# Patient Record
Sex: MALE | Race: WHITE | NOT HISPANIC OR LATINO | URBAN - METROPOLITAN AREA
[De-identification: names, ages, dates, MRNs, and addresses within clinical notes are randomized per-mention and may not be internally consistent; named-entity substitution may affect disease eponyms.]

---

## 2018-09-22 ENCOUNTER — EMERGENCY (EMERGENCY)
Facility: HOSPITAL | Age: 63
LOS: 1 days | Discharge: HOME | End: 2018-09-22
Attending: EMERGENCY MEDICINE
Payer: COMMERCIAL

## 2018-09-22 VITALS
RESPIRATION RATE: 18 BRPM | HEART RATE: 79 BPM | OXYGEN SATURATION: 98 % | SYSTOLIC BLOOD PRESSURE: 129 MMHG | DIASTOLIC BLOOD PRESSURE: 84 MMHG | TEMPERATURE: 98 F

## 2018-09-22 VITALS
RESPIRATION RATE: 17 BRPM | DIASTOLIC BLOOD PRESSURE: 71 MMHG | OXYGEN SATURATION: 96 % | TEMPERATURE: 100 F | SYSTOLIC BLOOD PRESSURE: 119 MMHG | HEART RATE: 79 BPM

## 2018-09-22 DIAGNOSIS — Z98.89 OTHER SPECIFIED POSTPROCEDURAL STATES: Chronic | ICD-10-CM

## 2018-09-22 DIAGNOSIS — Z90.49 ACQUIRED ABSENCE OF OTHER SPECIFIED PARTS OF DIGESTIVE TRACT: Chronic | ICD-10-CM

## 2018-09-22 PROCEDURE — 72070 X-RAY EXAM THORAC SPINE 2VWS: CPT | Mod: 26

## 2018-09-22 PROCEDURE — 72100 X-RAY EXAM L-S SPINE 2/3 VWS: CPT

## 2018-09-22 PROCEDURE — 72070 X-RAY EXAM THORAC SPINE 2VWS: CPT

## 2018-09-22 PROCEDURE — 72100 X-RAY EXAM L-S SPINE 2/3 VWS: CPT | Mod: 26

## 2018-09-22 PROCEDURE — 99283 EMERGENCY DEPT VISIT LOW MDM: CPT | Mod: 25

## 2018-09-22 PROCEDURE — 99283 EMERGENCY DEPT VISIT LOW MDM: CPT

## 2018-09-22 RX ORDER — IBUPROFEN 200 MG
400 TABLET ORAL ONCE
Qty: 0 | Refills: 0 | Status: COMPLETED | OUTPATIENT
Start: 2018-09-22 | End: 2018-09-22

## 2018-09-22 RX ADMIN — Medication 400 MILLIGRAM(S): at 16:10

## 2018-09-22 NOTE — ED PROVIDER NOTE - MEDICAL DECISION MAKING DETAILS
Pt with mechanical fall x yesterday, now with diffuse back pain. Will check T and L spine xray and reassess.

## 2018-09-22 NOTE — ED PROVIDER NOTE - PMH
Cholecystitis chronic, acute    GERD (gastroesophageal reflux disease)    IBS (irritable bowel syndrome)

## 2018-09-22 NOTE — ED PROVIDER NOTE - PROGRESS NOTE DETAILS
X-rays of T-S and L-S with no acute abnormality.  NSAID's advised and advised strict return precautions and PMD f/u.

## 2018-09-22 NOTE — ED PROVIDER NOTE - OBJECTIVE STATEMENT
61 y/o male with no significant PMHx presents to the ED c/o back pain s/p mechanical fall x yesterday. Pt notes he was up on a 4ft ladder when he fell backwards off of the ladder, landing on his back. Pt notes diffuse back pain. Pt denies weakness, numbness, LOC, HA, neck pain, head injury, hematuria, shortness of breath, or any other complaints. Pt notes nml urination following incident. Pt denies any social Hx. NKDA

## 2018-09-22 NOTE — ED PROVIDER NOTE - CHPI ED SYMPTOMS NEG
no HA, no neck pain, no head injury, no hematuria, no shortness of breath/no weakness/no numbness/no loss of consciousness

## 2019-12-09 ENCOUNTER — APPOINTMENT (OUTPATIENT)
Dept: VASCULAR SURGERY | Facility: CLINIC | Age: 64
End: 2019-12-09
Payer: COMMERCIAL

## 2019-12-09 VITALS
TEMPERATURE: 98.2 F | SYSTOLIC BLOOD PRESSURE: 118 MMHG | WEIGHT: 130 LBS | DIASTOLIC BLOOD PRESSURE: 75 MMHG | OXYGEN SATURATION: 98 % | BODY MASS INDEX: 24.55 KG/M2 | HEART RATE: 75 BPM | HEIGHT: 61 IN

## 2019-12-09 PROCEDURE — 93970 EXTREMITY STUDY: CPT

## 2019-12-09 PROCEDURE — 99203 OFFICE O/P NEW LOW 30 MIN: CPT

## 2020-06-15 ENCOUNTER — APPOINTMENT (OUTPATIENT)
Dept: VASCULAR SURGERY | Facility: CLINIC | Age: 65
End: 2020-06-15

## 2020-12-08 NOTE — ED ADULT NURSE NOTE - CAS EDN DISCHARGE ASSESSMENT
Alert and oriented to person, place and time Complex Repair And Tissue Cultured Epidermal Autograft Text: The defect edges were debeveled with a #15 scalpel blade.  The primary defect was closed partially with a complex linear closure.  Given the location of the defect, shape of the defect and the proximity to free margins an tissue cultured epidermal autograft was deemed most appropriate to repair the remaining defect.  The graft was trimmed to fit the size of the remaining defect.  The graft was then placed in the primary defect, oriented appropriately, and sutured into place.

## 2022-01-25 ENCOUNTER — NON-APPOINTMENT (OUTPATIENT)
Age: 67
End: 2022-01-25

## 2022-01-25 ENCOUNTER — APPOINTMENT (OUTPATIENT)
Dept: THORACIC SURGERY | Facility: CLINIC | Age: 67
End: 2022-01-25
Payer: COMMERCIAL

## 2022-01-25 VITALS
HEART RATE: 79 BPM | SYSTOLIC BLOOD PRESSURE: 129 MMHG | WEIGHT: 130 LBS | OXYGEN SATURATION: 95 % | RESPIRATION RATE: 17 BRPM | BODY MASS INDEX: 25.52 KG/M2 | DIASTOLIC BLOOD PRESSURE: 83 MMHG | HEIGHT: 60 IN

## 2022-01-25 PROCEDURE — 99205 OFFICE O/P NEW HI 60 MIN: CPT

## 2022-01-26 ENCOUNTER — NON-APPOINTMENT (OUTPATIENT)
Age: 67
End: 2022-01-26

## 2022-01-26 ENCOUNTER — APPOINTMENT (OUTPATIENT)
Dept: GASTROENTEROLOGY | Facility: CLINIC | Age: 67
End: 2022-01-26
Payer: COMMERCIAL

## 2022-01-26 PROCEDURE — 99442: CPT

## 2022-01-26 NOTE — HISTORY OF PRESENT ILLNESS
[FreeTextEntry1] : Mr. TROY OBRIEN, 66 year old male, never a smoker, w/ hx of Appendectomy and nephrectomy (Kidney donation); Severe reflux, Zenker's diverticulum and large hiatal hernia Referred by Dr. Goldsmith for further evaluation and treatment.\par \par  Esophagram and GI series on 1/5/22: \par - Zenker's diverticulum unchanged from the prior study measuring 6 mm\par - This fills and empties with associated vigorous cricopharyngeal contraction which relaxes rapidly\par - Sliding hiatal hernia\par \par Patient presents today for evaluation. Relays severe reflux that is interfering with quality of life; On PPI with no resolve of symptoms. Experiences occasional dysphagia, especially with pills. Today, patient denies worsening SOB, chest pain, cough, hemoptysis, fever, chills, night sweats, lightheadedness or dizziness.\par \par \par

## 2022-01-26 NOTE — ASSESSMENT
[FreeTextEntry1] : Mr. TROY OBRIEN, 66 year old male, never a smoker, w/ hx of Appendectomy and nephrectomy (Kidney donation); Severe reflux, Zenker's diverticulum and large hiatal hernia Referred by Dr. Goldsmith for further evaluation and treatment.\par \par I have independently reviewed the medical records at the time of this office consultation, and discussed the following interpretations and recommendations with the patient:\par - Discussed necessity for further testing to determine most appropriate treatment. Therefore, will refer to Dr. Mckay for HIDALGO and Manometry. In addition, recommendation for CT chest. Office will assist on obtaining esophagram imaging from outside institution. Return to clinic following all testing to discuss results and further plan of care. \par \par Recommendations reviewed with patient during this office visit, and all questions answered; Patient instructed on the importance of follow up and verbalizes understanding.\par \par I personally performed the services described in the documentation, reviewed the documentation recorded by the scribe in my presence and it accurately and completely records my words and actions.\par \par I, YANELIS Knowles-C, am scribing for and the presence of ANNA RothmanIM, the following sections HISTORY OF PRESENT ILLNESS, PAST MEDICAL/FAMILY/SOCIAL HISTORY; REVIEW OF SYSTEMS; VITAL SIGNS; PHYSICAL EXAM; DISPOSITION.\par \par \par \par

## 2022-01-26 NOTE — DATA REVIEWED
[FreeTextEntry1] : Independently reviewed the following imaging:\par - Esophagram and Gi series report

## 2022-01-26 NOTE — PHYSICAL EXAM
[General Appearance - Alert] : alert [General Appearance - In No Acute Distress] : in no acute distress [General Appearance - Well Nourished] : well nourished [Sclera] : the sclera and conjunctiva were normal [PERRL With Normal Accommodation] : pupils were equal in size, round, and reactive to light [Extraocular Movements] : extraocular movements were intact [Outer Ear] : the ears and nose were normal in appearance [Hearing Threshold Finger Rub Not [place]] : hearing was normal [Neck Appearance] : the appearance of the neck was normal [Neck Cervical Mass (___cm)] : no neck mass was observed [Respiration, Rhythm And Depth] : normal respiratory rhythm and effort [Exaggerated Use Of Accessory Muscles For Inspiration] : no accessory muscle use [Auscultation Breath Sounds / Voice Sounds] : lungs were clear to auscultation bilaterally [Heart Rate And Rhythm] : heart rate was normal and rhythm regular [Examination Of The Chest] : the chest was normal in appearance [Chest Visual Inspection Thoracic Asymmetry] : no chest asymmetry [Diminished Respiratory Excursion] : normal chest expansion [2+] : left 2+ [Breast Appearance] : normal in appearance [Breast Palpation Mass] : no palpable masses [Bowel Sounds] : normal bowel sounds [Abdomen Soft] : soft [Abdomen Tenderness] : non-tender [Cervical Lymph Nodes Enlarged Posterior Bilaterally] : posterior cervical [Cervical Lymph Nodes Enlarged Anterior Bilaterally] : anterior cervical [Supraclavicular Lymph Nodes Enlarged Bilaterally] : supraclavicular [No CVA Tenderness] : no ~M costovertebral angle tenderness [No Spinal Tenderness] : no spinal tenderness [Abnormal Walk] : normal gait [Nail Clubbing] : no clubbing  or cyanosis of the fingernails [Musculoskeletal - Swelling] : no joint swelling seen [Skin Color & Pigmentation] : normal skin color and pigmentation [Skin Turgor] : normal skin turgor [] : no rash [No Focal Deficits] : no focal deficits [Oriented To Time, Place, And Person] : oriented to person, place, and time [FreeTextEntry1] : Deferred

## 2022-03-02 ENCOUNTER — NON-APPOINTMENT (OUTPATIENT)
Age: 67
End: 2022-03-02

## 2022-03-08 ENCOUNTER — NON-APPOINTMENT (OUTPATIENT)
Age: 67
End: 2022-03-08

## 2022-03-09 ENCOUNTER — APPOINTMENT (OUTPATIENT)
Dept: GASTROENTEROLOGY | Facility: HOSPITAL | Age: 67
End: 2022-03-09

## 2022-03-09 ENCOUNTER — RESULT REVIEW (OUTPATIENT)
Age: 67
End: 2022-03-09

## 2022-03-09 ENCOUNTER — OUTPATIENT (OUTPATIENT)
Dept: OUTPATIENT SERVICES | Facility: HOSPITAL | Age: 67
LOS: 1 days | End: 2022-03-09
Payer: COMMERCIAL

## 2022-03-09 VITALS
SYSTOLIC BLOOD PRESSURE: 119 MMHG | OXYGEN SATURATION: 100 % | HEART RATE: 78 BPM | DIASTOLIC BLOOD PRESSURE: 76 MMHG | RESPIRATION RATE: 16 BRPM

## 2022-03-09 VITALS
RESPIRATION RATE: 21 BRPM | DIASTOLIC BLOOD PRESSURE: 84 MMHG | SYSTOLIC BLOOD PRESSURE: 125 MMHG | TEMPERATURE: 97 F | WEIGHT: 130.07 LBS | HEART RATE: 75 BPM | OXYGEN SATURATION: 100 % | HEIGHT: 60 IN

## 2022-03-09 DIAGNOSIS — Z90.49 ACQUIRED ABSENCE OF OTHER SPECIFIED PARTS OF DIGESTIVE TRACT: Chronic | ICD-10-CM

## 2022-03-09 DIAGNOSIS — K22.5 DIVERTICULUM OF ESOPHAGUS, ACQUIRED: ICD-10-CM

## 2022-03-09 DIAGNOSIS — Z90.5 ACQUIRED ABSENCE OF KIDNEY: Chronic | ICD-10-CM

## 2022-03-09 DIAGNOSIS — Z98.89 OTHER SPECIFIED POSTPROCEDURAL STATES: Chronic | ICD-10-CM

## 2022-03-09 DIAGNOSIS — K44.9 DIAPHRAGMATIC HERNIA WITHOUT OBSTRUCTION OR GANGRENE: ICD-10-CM

## 2022-03-09 PROCEDURE — 88305 TISSUE EXAM BY PATHOLOGIST: CPT | Mod: 26

## 2022-03-09 PROCEDURE — 91035 G-ESOPH REFLX TST W/ELECTROD: CPT

## 2022-03-09 PROCEDURE — 43239 EGD BIOPSY SINGLE/MULTIPLE: CPT

## 2022-03-09 PROCEDURE — 88305 TISSUE EXAM BY PATHOLOGIST: CPT

## 2022-03-09 RX ORDER — SODIUM CHLORIDE 9 MG/ML
500 INJECTION INTRAMUSCULAR; INTRAVENOUS; SUBCUTANEOUS
Refills: 0 | Status: DISCONTINUED | OUTPATIENT
Start: 2022-03-09 | End: 2022-03-23

## 2022-03-09 NOTE — ASU DISCHARGE PLAN (ADULT/PEDIATRIC) - NS MD DC FALL RISK RISK
For information on Fall & Injury Prevention, visit: [email or web address] OR  [email or web address] OR  https://www.cdc.gov/steadi/patient.html

## 2022-03-09 NOTE — ASU PATIENT PROFILE, ADULT - NSICDXPASTSURGICALHX_GEN_ALL_CORE_FT
PAST SURGICAL HISTORY:  S/P appendectomy     S/P cholecystectomy      PAST SURGICAL HISTORY:  H/O kidney removal left for donor    S/P appendectomy     S/P cholecystectomy

## 2022-03-09 NOTE — PRE PROCEDURE NOTE - PRE PROCEDURE EVALUATION
Attending Physician:              Shabbir Mckay MD MSEd    Indication for Procedure          HH      PAST MEDICAL & SURGICAL HISTORY:  IBS (irritable bowel syndrome)    GERD (gastroesophageal reflux disease)    Cholecystitis chronic, acute    S/P appendectomy    S/P cholecystectomy    H/O kidney removal  left for donor          See Allscripts Note for further details  ALLERGIES:  No Known Allergies    HOME MEDICATIONS:  omeprazole 40 mg oral delayed release capsule: 1 cap(s) orally once a day  traMADol 50 mg oral tablet: 1 tab(s) orally every 4 hours, As Needed  Wellbutrin  mg/24 hours oral tablet, extended release: 1 tab(s) orally every 24 hours      See Allscripts Note for further details    AICD/PPM: [ ] yes   [x ] no    PERTINENT LAB DATA:                      PHYSICAL EXAMINATION:    Height (cm): 152.4  Weight (kg): 59  BMI (kg/m2): 25.4  BSA (m2): 1.55T(C): 36.1  HR: 75  BP: 125/84  RR: 21  SpO2: 100%    Constitutional: NAD  HEENT: PERRLA, EOMI,    Neck:  No JVD  Respiratory: CTAB/L  Cardiovascular: S1 and S2  Gastrointestinal: BS+, soft, NT/ND  Extremities: No peripheral edema  Neurological: A/O x 3, no focal deficits  Psychiatric: Normal mood, normal affect  Skin: No rashes    ASA Class: I [ ]  II [ ]  III [x ]  IV [ ]    COMMENTS:    The patient is a suitable candidate for the planned procedure unless box checked [ ]  No, explain:

## 2022-03-09 NOTE — PRE-ANESTHESIA EVALUATION ADULT - NSANTHOSAYNRD_GEN_A_CORE
No. SY screening performed.  STOP BANG Legend: 0-2 = LOW Risk; 3-4 = INTERMEDIATE Risk; 5-8 = HIGH Risk

## 2022-03-09 NOTE — ASU PATIENT PROFILE, ADULT - NSICDXPASTMEDICALHX_GEN_ALL_CORE_FT
PAST MEDICAL HISTORY:  Cholecystitis chronic, acute     GERD (gastroesophageal reflux disease)     IBS (irritable bowel syndrome)

## 2022-03-09 NOTE — ASU PATIENT PROFILE, ADULT - FALL HARM RISK - UNIVERSAL INTERVENTIONS
Bed in lowest position, wheels locked, appropriate side rails in place/Call bell, personal items and telephone in reach/Instruct patient to call for assistance before getting out of bed or chair/Non-slip footwear when patient is out of bed/[place] to call system/Physically safe environment - no spills, clutter or unnecessary equipment/Purposeful Proactive Rounding/Room/bathroom lighting operational, light cord in reach

## 2022-03-11 LAB — SURGICAL PATHOLOGY STUDY: SIGNIFICANT CHANGE UP

## 2022-03-11 PROCEDURE — 91035 G-ESOPH REFLX TST W/ELECTROD: CPT | Mod: 26

## 2022-03-16 ENCOUNTER — APPOINTMENT (OUTPATIENT)
Dept: GASTROENTEROLOGY | Facility: HOSPITAL | Age: 67
End: 2022-03-16

## 2022-03-16 ENCOUNTER — OUTPATIENT (OUTPATIENT)
Dept: OUTPATIENT SERVICES | Facility: HOSPITAL | Age: 67
LOS: 1 days | End: 2022-03-16
Payer: COMMERCIAL

## 2022-03-16 VITALS
WEIGHT: 134.92 LBS | DIASTOLIC BLOOD PRESSURE: 73 MMHG | RESPIRATION RATE: 16 BRPM | HEIGHT: 62 IN | OXYGEN SATURATION: 100 % | TEMPERATURE: 97 F | SYSTOLIC BLOOD PRESSURE: 115 MMHG

## 2022-03-16 DIAGNOSIS — Z98.89 OTHER SPECIFIED POSTPROCEDURAL STATES: Chronic | ICD-10-CM

## 2022-03-16 DIAGNOSIS — Z90.5 ACQUIRED ABSENCE OF KIDNEY: Chronic | ICD-10-CM

## 2022-03-16 DIAGNOSIS — K22.5 DIVERTICULUM OF ESOPHAGUS, ACQUIRED: ICD-10-CM

## 2022-03-16 DIAGNOSIS — Z90.49 ACQUIRED ABSENCE OF OTHER SPECIFIED PARTS OF DIGESTIVE TRACT: Chronic | ICD-10-CM

## 2022-03-16 PROCEDURE — 91037 ESOPH IMPED FUNCTION TEST: CPT

## 2022-03-16 PROCEDURE — 91010 ESOPHAGUS MOTILITY STUDY: CPT

## 2022-03-16 PROCEDURE — 91010 ESOPHAGUS MOTILITY STUDY: CPT | Mod: 26

## 2022-03-16 PROCEDURE — 91037 ESOPH IMPED FUNCTION TEST: CPT | Mod: 26

## 2022-03-16 NOTE — PRE PROCEDURE NOTE - PRE PROCEDURE EVALUATION
Attending Physician:              Shabbir Mckay MD MSEd  Performing provider:            YANELIS Leblanc  Indication for Procedure:       Hiatal Hernia, Zenker's Diverticulum                PAST MEDICAL & SURGICAL HISTORY:  IBS (irritable bowel syndrome)    GERD (gastroesophageal reflux disease)    Cholecystitis chronic, acute    S/P appendectomy    S/P cholecystectomy    H/O kidney removal  left for donor          See Allscripts Note for further details  ALLERGIES:  No Known Allergies    HOME MEDICATIONS:  omeprazole 40 mg oral delayed release capsule: 1 cap(s) orally once a day  traMADol 50 mg oral tablet: 1 tab(s) orally every 4 hours, As Needed  Wellbutrin  mg/24 hours oral tablet, extended release: 1 tab(s) orally every 24 hours      See Allscripts Note for further details    AICD/PPM: [ ] yes   [ X] no    PERTINENT LAB DATA:                      PHYSICAL EXAMINATION:    Height (cm): 157.5  Weight (kg): 61.2  BMI (kg/m2): 24.7  BSA (m2): 1.62T(C): 36.2  HR: --  BP: 115/73  RR: 16  SpO2: 100%    Constitutional: NAD  HEENT: PERRLA, EOMI,    Neck:  No JVD  Respiratory: CTAB/L  Cardiovascular: S1 and S2  Gastrointestinal: BS+, soft, NT/ND  Extremities: No peripheral edema  Neurological: A/O x 3, no focal deficits  Psychiatric: Normal mood, normal affect  Skin: No rashes    ASA Class: I [ ]  II [ X]  III [ ]  IV [ ]    COMMENTS:    The patient is a suitable candidate for the planned procedure unless box checked [ ]  No, explain:

## 2022-03-16 NOTE — ASU DISCHARGE PLAN (ADULT/PEDIATRIC) - NS MD DC FALL RISK RISK
For information on Fall & Injury Prevention, visit: [email or web address] OR  [email or web address] OR  https://www.cdc.gov/steadi/patient.html

## 2022-03-16 NOTE — ASU PATIENT PROFILE, ADULT - NSICDXPASTSURGICALHX_GEN_ALL_CORE_FT
PAST SURGICAL HISTORY:  H/O kidney removal left for donor    S/P appendectomy     S/P cholecystectomy

## 2022-03-16 NOTE — ASU PATIENT PROFILE, ADULT - FALL HARM RISK - UNIVERSAL INTERVENTIONS
Bed in lowest position, wheels locked, appropriate side rails in place/Call bell, personal items and telephone in reach/Instruct patient to call for assistance before getting out of bed or chair/Non-slip footwear when patient is out of bed/[place] to call system/Physically safe environment - no spills, clutter or unnecessary equipment/Purposeful Proactive Rounding/Room/bathroom lighting operational, light cord in reach

## 2022-03-22 ENCOUNTER — NON-APPOINTMENT (OUTPATIENT)
Age: 67
End: 2022-03-22

## 2022-03-30 ENCOUNTER — NON-APPOINTMENT (OUTPATIENT)
Age: 67
End: 2022-03-30

## 2022-04-05 ENCOUNTER — APPOINTMENT (OUTPATIENT)
Dept: THORACIC SURGERY | Facility: CLINIC | Age: 67
End: 2022-04-05
Payer: COMMERCIAL

## 2022-04-05 VITALS
BODY MASS INDEX: 25.52 KG/M2 | HEIGHT: 60 IN | HEART RATE: 83 BPM | DIASTOLIC BLOOD PRESSURE: 80 MMHG | OXYGEN SATURATION: 96 % | WEIGHT: 130 LBS | SYSTOLIC BLOOD PRESSURE: 132 MMHG

## 2022-04-05 PROCEDURE — 99214 OFFICE O/P EST MOD 30 MIN: CPT

## 2022-04-05 NOTE — PHYSICAL EXAM
[Sclera] : the sclera and conjunctiva were normal [PERRL With Normal Accommodation] : pupils were equal in size, round, and reactive to light [Extraocular Movements] : extraocular movements were intact [Neck Appearance] : the appearance of the neck was normal [Neck Cervical Mass (___cm)] : no neck mass was observed [] : no respiratory distress [Respiration, Rhythm And Depth] : normal respiratory rhythm and effort [Exaggerated Use Of Accessory Muscles For Inspiration] : no accessory muscle use [Auscultation Breath Sounds / Voice Sounds] : lungs were clear to auscultation bilaterally [Examination Of The Chest] : the chest was normal in appearance [Chest Visual Inspection Thoracic Asymmetry] : no chest asymmetry [Diminished Respiratory Excursion] : normal chest expansion [2+] : left 2+ [Breast Appearance] : normal in appearance [Breast Palpation Mass] : no palpable masses [Bowel Sounds] : normal bowel sounds [Abdomen Soft] : soft [Abdomen Tenderness] : non-tender [Cervical Lymph Nodes Enlarged Posterior Bilaterally] : posterior cervical [Cervical Lymph Nodes Enlarged Anterior Bilaterally] : anterior cervical [Supraclavicular Lymph Nodes Enlarged Bilaterally] : supraclavicular [Abnormal Walk] : normal gait [Nail Clubbing] : no clubbing  or cyanosis of the fingernails [Musculoskeletal - Swelling] : no joint swelling seen [Motor Tone] : muscle strength and tone were normal [Skin Turgor] : normal skin turgor [No Focal Deficits] : no focal deficits [Oriented To Time, Place, And Person] : oriented to person, place, and time

## 2022-04-08 ENCOUNTER — TRANSCRIPTION ENCOUNTER (OUTPATIENT)
Age: 67
End: 2022-04-08

## 2022-04-08 ENCOUNTER — NON-APPOINTMENT (OUTPATIENT)
Age: 67
End: 2022-04-08

## 2022-04-08 ENCOUNTER — APPOINTMENT (OUTPATIENT)
Dept: CARDIOLOGY | Facility: CLINIC | Age: 67
End: 2022-04-08
Payer: COMMERCIAL

## 2022-04-08 VITALS
TEMPERATURE: 98 F | RESPIRATION RATE: 16 BRPM | DIASTOLIC BLOOD PRESSURE: 84 MMHG | SYSTOLIC BLOOD PRESSURE: 124 MMHG | HEART RATE: 73 BPM | OXYGEN SATURATION: 97 % | WEIGHT: 130 LBS | BODY MASS INDEX: 25.52 KG/M2 | HEIGHT: 60 IN

## 2022-04-08 DIAGNOSIS — K21.9 GASTRO-ESOPHAGEAL REFLUX DISEASE W/OUT ESOPHAGITIS: ICD-10-CM

## 2022-04-08 DIAGNOSIS — Z01.810 ENCOUNTER FOR PREPROCEDURAL CARDIOVASCULAR EXAMINATION: ICD-10-CM

## 2022-04-08 PROCEDURE — 99204 OFFICE O/P NEW MOD 45 MIN: CPT

## 2022-04-08 PROCEDURE — 93000 ELECTROCARDIOGRAM COMPLETE: CPT | Mod: NC

## 2022-04-08 NOTE — ASSESSMENT
[FreeTextEntry1] : Mr. TROY OBRIEN, 66 year old male, never a smoker, w/ hx of Appendectomy and nephrectomy (Kidney donation); Severe reflux, Zenker's diverticulum and large hiatal hernia Referred by Dr. Goldsmith for further evaluation and treatment.\par \par Here today with follow up testing \par I have independently reviewed the medical records at the time of this office consultation, and discussed the following interpretations and recommendations with the patient:\par -  Follow up testing reviewed with patient. Discussed surgery for symptom relief, therefore, have recommended: Upper endoscopy, Redo robotic assisted laparoscopy, repair of hiatal hernia with partial fundoplication. Risks, benefits and alternatives explained to patient; All questions answered and patient agrees to proceed with surgery. \par - Cardiac clearance required prior to procedure. \par \par Recommendations reviewed with patient during this office visit, and all questions answered; Patient instructed on the importance of follow up and verbalizes understanding.\par \par I personally performed the services described in the documentation, reviewed the documentation recorded by the scribe in my presence and it accurately and completely records my words and actions.\par \par I, Lisa Benjamin ANP-C, am scribing for and the presence of BRIANNA Rothman, the following sections HISTORY OF PRESENT ILLNESS, PAST MEDICAL/FAMILY/SOCIAL HISTORY; REVIEW OF SYSTEMS; VITAL SIGNS; PHYSICAL EXAM; DISPOSITION.\par \par \par \par \par

## 2022-04-08 NOTE — HISTORY OF PRESENT ILLNESS
[FreeTextEntry1] : Mr. TROY OBRIEN, 66 year old male, never a smoker, w/ hx of Appendectomy and nephrectomy (Kidney donation); Severe reflux, Zenker's diverticulum and large hiatal hernia Referred by Dr. Goldsmith for further evaluation and treatment.\par \par  Esophagram and GI series on 1/5/22: \par - Zenker's diverticulum unchanged from the prior study measuring 6 mm\par - This fills and empties with associated vigorous cricopharyngeal contraction which relaxes rapidly\par - Sliding hiatal hernia\par \par CT Chest on 1/31/22:\par - KNown Zenker's diverticulum is not visible on CT\par - Contrast within the esophagus which may be due to retention or reflux\par \par Endoscopy and BRAVO on 3/9/22: LA grade B reflux esophagitis in setting of 4 cm HH. Few gastric polyps Path demonstrating chronic inactive gastritis; No Helicobacter microorganisms identified; No evidence of intestinal metaplasia. DeMeester score 41.7; Positive pH study OFF therapy. \par \par Manometry on 3/16/22: \par - Ineffective esophageal motility with adequate peristaltic reserve\par \par Patient returns to clinic to discuss follow up testing and further plan of care. Continues to experience reflux;  interfering with quality of life; On PPI with no relief of symptoms. Experiences occasional dysphagia, especially with pills. Today, patient denies worsening SOB, chest pain, cough, hemoptysis, fever, chills, night sweats, lightheadedness or dizziness.\par \par \par

## 2022-04-08 NOTE — DATA REVIEWED
[FreeTextEntry1] : Independently reviewed the following:\par - CT Chest on 1/31/22:\par - Endoscopy and BRAVO on 3/9/22\par - Manometry on 3/16/22

## 2022-04-13 PROBLEM — K21.9 GASTROESOPHAGEAL REFLUX DISEASE: Status: ACTIVE | Noted: 2022-04-13

## 2022-04-13 PROBLEM — Z01.810 PREOPERATIVE CARDIOVASCULAR EXAMINATION: Status: ACTIVE | Noted: 2022-04-08

## 2022-04-13 NOTE — HISTORY OF PRESENT ILLNESS
[FreeTextEntry1] : Mr. Flood presents to the office today for a pre-operative cardiovascular evaluation.\par \par Mr. Flood is a 66 year old gentleman with a medical history significant for a hiatus hernia leading to significant gastroesophageal reflux disease (GERD; see below) and a Zenker's diverticulum.  In addition, he has previously undergone a left nephrectomy as part of a living donor procedure for his wife (2019), a cholecystectomy, and appendectomy.  He does not have a history of hypertension, hyperlipidemia, or diabetes mellitus.\par \par Mr. Flood is currently scheduled to undergo upper endoscopy, robotic assisted laparoscopy, and repair of a hiatus hernia with partial fundoplication.  The surgery is currently scheduled to take place on May 6th, 2022.  A recent video esophagram and GI series demonstrated evidence of moderately severe esophageal reflux in association with the hiatus hernia.  Of note also is that Mr. Flood also has a known, small Zenker's diverticulum.\par \par Mr. Flood reports that he is able to walk without difficulty or limitation.  He works as an  and performs a lot of physically demanding work on a daily basis.  Mr. Flood denies having any chest pain or dyspnea either at rest or with exertion.  There has been no palpitations, presyncope, or syncope.  Mr. Flood denies having any orthopnea, paroxysmal nocturnal dyspnea, or edema.

## 2022-04-13 NOTE — DISCUSSION/SUMMARY
[FreeTextEntry1] : In summary, Mr. Flood is a 66 year old gentleman with a medical history significant for a hiatus hernia leading to significant gastroesophageal reflux disease, and a Zenker's diverticulum.  In addition, he has previously undergone a left nephrectomy as part of a living donor procedure for his wife (2019), a cholecystectomy, and appendectomy.  He does not have a history of hypertension, hyperlipidemia, or diabetes mellitus.\par \par Mr. Flood is currently scheduled to undergo upper endoscopy, robotic assisted laparoscopy, and repair of a hiatus hernia with partial fundoplication.  The surgery is currently scheduled to take place on May 6th, 2022.\par \par Mr. Flood maintains an active lifestyle and is able to exercise without difficulty or limitation.  He is currently asymptomatic from a cardiac standpoint and has a normal resting ECG and a normal blood pressure without antihypertensive therapy.  I indicated to Mr. Flood that he could proceed with the proposed thoracic surgery without further cardiac risk stratification required.\par \par Again, I would like to thank you for the privilege of participating in the care of your patient.  If you have any questions or concerns, please do not hesitate to contact me.

## 2022-04-13 NOTE — REASON FOR VISIT
[FreeTextEntry1] : Mr. Flood presents to the office today for a pre-operative cardiovascular evaluation.

## 2022-04-20 ENCOUNTER — OUTPATIENT (OUTPATIENT)
Dept: OUTPATIENT SERVICES | Facility: HOSPITAL | Age: 67
LOS: 1 days | End: 2022-04-20

## 2022-04-20 VITALS
WEIGHT: 130.07 LBS | SYSTOLIC BLOOD PRESSURE: 149 MMHG | RESPIRATION RATE: 16 BRPM | OXYGEN SATURATION: 98 % | DIASTOLIC BLOOD PRESSURE: 86 MMHG | HEART RATE: 81 BPM | TEMPERATURE: 97 F | HEIGHT: 60 IN

## 2022-04-20 DIAGNOSIS — K44.9 DIAPHRAGMATIC HERNIA WITHOUT OBSTRUCTION OR GANGRENE: ICD-10-CM

## 2022-04-20 DIAGNOSIS — Z90.5 ACQUIRED ABSENCE OF KIDNEY: Chronic | ICD-10-CM

## 2022-04-20 DIAGNOSIS — Z90.49 ACQUIRED ABSENCE OF OTHER SPECIFIED PARTS OF DIGESTIVE TRACT: Chronic | ICD-10-CM

## 2022-04-20 DIAGNOSIS — Z98.89 OTHER SPECIFIED POSTPROCEDURAL STATES: Chronic | ICD-10-CM

## 2022-04-20 LAB
ALBUMIN SERPL ELPH-MCNC: 4.5 G/DL — SIGNIFICANT CHANGE UP (ref 3.3–5)
ALP SERPL-CCNC: 97 U/L — SIGNIFICANT CHANGE UP (ref 40–120)
ALT FLD-CCNC: 18 U/L — SIGNIFICANT CHANGE UP (ref 4–41)
ANION GAP SERPL CALC-SCNC: 11 MMOL/L — SIGNIFICANT CHANGE UP (ref 7–14)
AST SERPL-CCNC: 27 U/L — SIGNIFICANT CHANGE UP (ref 4–40)
BILIRUB SERPL-MCNC: 0.3 MG/DL — SIGNIFICANT CHANGE UP (ref 0.2–1.2)
BLD GP AB SCN SERPL QL: NEGATIVE — SIGNIFICANT CHANGE UP
BUN SERPL-MCNC: 14 MG/DL — SIGNIFICANT CHANGE UP (ref 7–23)
CALCIUM SERPL-MCNC: 8.8 MG/DL — SIGNIFICANT CHANGE UP (ref 8.4–10.5)
CHLORIDE SERPL-SCNC: 102 MMOL/L — SIGNIFICANT CHANGE UP (ref 98–107)
CO2 SERPL-SCNC: 24 MMOL/L — SIGNIFICANT CHANGE UP (ref 22–31)
CREAT SERPL-MCNC: 1.3 MG/DL — SIGNIFICANT CHANGE UP (ref 0.5–1.3)
EGFR: 60 ML/MIN/1.73M2 — SIGNIFICANT CHANGE UP
GLUCOSE SERPL-MCNC: 73 MG/DL — SIGNIFICANT CHANGE UP (ref 70–99)
HCT VFR BLD CALC: 45.1 % — SIGNIFICANT CHANGE UP (ref 39–50)
HGB BLD-MCNC: 14.3 G/DL — SIGNIFICANT CHANGE UP (ref 13–17)
MCHC RBC-ENTMCNC: 29.5 PG — SIGNIFICANT CHANGE UP (ref 27–34)
MCHC RBC-ENTMCNC: 31.7 GM/DL — LOW (ref 32–36)
MCV RBC AUTO: 93.2 FL — SIGNIFICANT CHANGE UP (ref 80–100)
NRBC # BLD: 0 /100 WBCS — SIGNIFICANT CHANGE UP
NRBC # FLD: 0 K/UL — SIGNIFICANT CHANGE UP
PLATELET # BLD AUTO: 223 K/UL — SIGNIFICANT CHANGE UP (ref 150–400)
POTASSIUM SERPL-MCNC: 4.6 MMOL/L — SIGNIFICANT CHANGE UP (ref 3.5–5.3)
POTASSIUM SERPL-SCNC: 4.6 MMOL/L — SIGNIFICANT CHANGE UP (ref 3.5–5.3)
PROT SERPL-MCNC: 7 G/DL — SIGNIFICANT CHANGE UP (ref 6–8.3)
RBC # BLD: 4.84 M/UL — SIGNIFICANT CHANGE UP (ref 4.2–5.8)
RBC # FLD: 12.6 % — SIGNIFICANT CHANGE UP (ref 10.3–14.5)
RH IG SCN BLD-IMP: POSITIVE — SIGNIFICANT CHANGE UP
SODIUM SERPL-SCNC: 137 MMOL/L — SIGNIFICANT CHANGE UP (ref 135–145)
WBC # BLD: 5.6 K/UL — SIGNIFICANT CHANGE UP (ref 3.8–10.5)
WBC # FLD AUTO: 5.6 K/UL — SIGNIFICANT CHANGE UP (ref 3.8–10.5)

## 2022-04-20 RX ORDER — SODIUM CHLORIDE 9 MG/ML
1000 INJECTION, SOLUTION INTRAVENOUS
Refills: 0 | Status: DISCONTINUED | OUTPATIENT
Start: 2022-05-06 | End: 2022-05-07

## 2022-04-20 NOTE — H&P PST ADULT - NSICDXFAMILYHX_GEN_ALL_CORE_FT
FAMILY HISTORY:  Father  Still living? Yes, Estimated age: Age Unknown  Family history of diabetes mellitus (DM), Age at diagnosis: Age Unknown    Mother  Still living? Unknown  Family history of diabetes mellitus (DM), Age at diagnosis: Age Unknown

## 2022-04-20 NOTE — H&P PST ADULT - NSICDXPASTMEDICALHX_GEN_ALL_CORE_FT
PAST MEDICAL HISTORY:  Cholecystitis chronic, acute     GERD (gastroesophageal reflux disease)     IBS (irritable bowel syndrome)      PAST MEDICAL HISTORY:  Cholecystitis chronic, acute     GERD (gastroesophageal reflux disease)     Hernia, hiatal     IBS (irritable bowel syndrome)

## 2022-04-20 NOTE — H&P PST ADULT - GASTROINTESTINAL COMMENTS
GERD, and hx of back pain  after eating.  Dx with hiatal hernia. Scheduled for Upper Esophagogastroduodenoscopy redo robotic assisted laparoscopic repair of hiatal hernia

## 2022-04-20 NOTE — H&P PST ADULT - ASSESSMENT
65 y/o male with hx of GERD, and hx of back pain  after eating.  Dx with hiatal hernia. Scheduled for Upper Esophagogastroduodenoscopy redo robotic assisted laparoscopic repair of hiatal hernia

## 2022-04-20 NOTE — H&P PST ADULT - PROBLEM SELECTOR PLAN 1
Upper Esophagogastroduodenoscopy redo robotic assisted laparoscopic repair of hiatal hernia     CBC CMP T&S EKG    Preop instructions and antibacterial soap given and explained (verbal and written), with teach back.     Pt had cardiology eval as requested by Surgeon, on chart

## 2022-04-20 NOTE — H&P PST ADULT - HISTORY OF PRESENT ILLNESS
67 y/o male with hx of GERD, and hx of back pain  after eating.  Dx with hiatal hernia. Scheduled for Upper Esophagogastroduodenoscopy redo robotic assisted laparoscopic repair of hiatal hernia

## 2022-04-22 DIAGNOSIS — Z01.818 ENCOUNTER FOR OTHER PREPROCEDURAL EXAMINATION: ICD-10-CM

## 2022-05-03 LAB — SARS-COV-2 N GENE NPH QL NAA+PROBE: NOT DETECTED

## 2022-05-05 NOTE — ASU PATIENT PROFILE, ADULT - NSICDXPASTMEDICALHX_GEN_ALL_CORE_FT
PAST MEDICAL HISTORY:  Cholecystitis chronic, acute     GERD (gastroesophageal reflux disease)     Hernia, hiatal     IBS (irritable bowel syndrome)

## 2022-05-05 NOTE — ASU PATIENT PROFILE, ADULT - FALL HARM RISK - UNIVERSAL INTERVENTIONS
Bed in lowest position, wheels locked, appropriate side rails in place/Call bell, personal items and telephone in reach/Instruct patient to call for assistance before getting out of bed or chair/Non-slip footwear when patient is out of bed/[place] to call system/Physically safe environment - no spills, clutter or unnecessary equipment/Purposeful Proactive Rounding/Room/bathroom lighting operational, light cord in reach

## 2022-05-06 ENCOUNTER — RESULT REVIEW (OUTPATIENT)
Age: 67
End: 2022-05-06

## 2022-05-06 ENCOUNTER — INPATIENT (INPATIENT)
Facility: HOSPITAL | Age: 67
LOS: 1 days | Discharge: HOME | End: 2022-05-08
Attending: THORACIC SURGERY (CARDIOTHORACIC VASCULAR SURGERY) | Admitting: THORACIC SURGERY (CARDIOTHORACIC VASCULAR SURGERY)
Payer: COMMERCIAL

## 2022-05-06 ENCOUNTER — TRANSCRIPTION ENCOUNTER (OUTPATIENT)
Age: 67
End: 2022-05-06

## 2022-05-06 ENCOUNTER — APPOINTMENT (OUTPATIENT)
Dept: THORACIC SURGERY | Facility: HOSPITAL | Age: 67
End: 2022-05-06

## 2022-05-06 VITALS
OXYGEN SATURATION: 97 % | WEIGHT: 130.07 LBS | HEIGHT: 60 IN | DIASTOLIC BLOOD PRESSURE: 89 MMHG | RESPIRATION RATE: 16 BRPM | SYSTOLIC BLOOD PRESSURE: 131 MMHG | TEMPERATURE: 98 F | HEART RATE: 75 BPM

## 2022-05-06 DIAGNOSIS — K44.9 DIAPHRAGMATIC HERNIA WITHOUT OBSTRUCTION OR GANGRENE: ICD-10-CM

## 2022-05-06 DIAGNOSIS — Z90.5 ACQUIRED ABSENCE OF KIDNEY: Chronic | ICD-10-CM

## 2022-05-06 DIAGNOSIS — Z98.89 OTHER SPECIFIED POSTPROCEDURAL STATES: Chronic | ICD-10-CM

## 2022-05-06 DIAGNOSIS — Z90.49 ACQUIRED ABSENCE OF OTHER SPECIFIED PARTS OF DIGESTIVE TRACT: Chronic | ICD-10-CM

## 2022-05-06 LAB — RH IG SCN BLD-IMP: POSITIVE — SIGNIFICANT CHANGE UP

## 2022-05-06 PROCEDURE — 43281 LAP PARAESOPHAG HERN REPAIR: CPT

## 2022-05-06 PROCEDURE — 99233 SBSQ HOSP IP/OBS HIGH 50: CPT

## 2022-05-06 PROCEDURE — S2900 ROBOTIC SURGICAL SYSTEM: CPT | Mod: NC

## 2022-05-06 PROCEDURE — 88302 TISSUE EXAM BY PATHOLOGIST: CPT | Mod: 26

## 2022-05-06 PROCEDURE — 71045 X-RAY EXAM CHEST 1 VIEW: CPT | Mod: 26

## 2022-05-06 DEVICE — LIGATING CLIPS WECK HEMOLOK POLYMER LARGE (PURPLE) 6: Type: IMPLANTABLE DEVICE | Status: FUNCTIONAL

## 2022-05-06 RX ORDER — ONDANSETRON 8 MG/1
4 TABLET, FILM COATED ORAL EVERY 6 HOURS
Refills: 0 | Status: DISCONTINUED | OUTPATIENT
Start: 2022-05-06 | End: 2022-05-08

## 2022-05-06 RX ORDER — OMEPRAZOLE 10 MG/1
1 CAPSULE, DELAYED RELEASE ORAL
Qty: 0 | Refills: 0 | DISCHARGE

## 2022-05-06 RX ORDER — PANTOPRAZOLE SODIUM 20 MG/1
40 TABLET, DELAYED RELEASE ORAL ONCE
Refills: 0 | Status: DISCONTINUED | OUTPATIENT
Start: 2022-05-06 | End: 2022-05-07

## 2022-05-06 RX ORDER — HYDROMORPHONE HYDROCHLORIDE 2 MG/ML
0.5 INJECTION INTRAMUSCULAR; INTRAVENOUS; SUBCUTANEOUS
Refills: 0 | Status: DISCONTINUED | OUTPATIENT
Start: 2022-05-06 | End: 2022-05-07

## 2022-05-06 RX ORDER — HEPARIN SODIUM 5000 [USP'U]/ML
5000 INJECTION INTRAVENOUS; SUBCUTANEOUS EVERY 8 HOURS
Refills: 0 | Status: DISCONTINUED | OUTPATIENT
Start: 2022-05-06 | End: 2022-05-08

## 2022-05-06 RX ORDER — HYDROMORPHONE HYDROCHLORIDE 2 MG/ML
30 INJECTION INTRAMUSCULAR; INTRAVENOUS; SUBCUTANEOUS
Refills: 0 | Status: DISCONTINUED | OUTPATIENT
Start: 2022-05-06 | End: 2022-05-07

## 2022-05-06 RX ORDER — SODIUM CHLORIDE 9 MG/ML
1000 INJECTION, SOLUTION INTRAVENOUS
Refills: 0 | Status: DISCONTINUED | OUTPATIENT
Start: 2022-05-06 | End: 2022-05-07

## 2022-05-06 RX ORDER — BUPROPION HYDROCHLORIDE 150 MG/1
300 TABLET, EXTENDED RELEASE ORAL
Qty: 0 | Refills: 0 | DISCHARGE

## 2022-05-06 RX ORDER — ACETAMINOPHEN 500 MG
1000 TABLET ORAL ONCE
Refills: 0 | Status: COMPLETED | OUTPATIENT
Start: 2022-05-06 | End: 2022-05-07

## 2022-05-06 RX ORDER — NALOXONE HYDROCHLORIDE 4 MG/.1ML
0.1 SPRAY NASAL
Refills: 0 | Status: DISCONTINUED | OUTPATIENT
Start: 2022-05-06 | End: 2022-05-08

## 2022-05-06 RX ADMIN — HEPARIN SODIUM 5000 UNIT(S): 5000 INJECTION INTRAVENOUS; SUBCUTANEOUS at 13:56

## 2022-05-06 RX ADMIN — SODIUM CHLORIDE 100 MILLILITER(S): 9 INJECTION, SOLUTION INTRAVENOUS at 20:23

## 2022-05-06 RX ADMIN — HYDROMORPHONE HYDROCHLORIDE 30 MILLILITER(S): 2 INJECTION INTRAMUSCULAR; INTRAVENOUS; SUBCUTANEOUS at 20:22

## 2022-05-06 RX ADMIN — HEPARIN SODIUM 5000 UNIT(S): 5000 INJECTION INTRAVENOUS; SUBCUTANEOUS at 21:40

## 2022-05-06 NOTE — BRIEF OPERATIVE NOTE - OPERATION/FINDINGS
robotic assisted laparoscopic, lysis of adhesions, hiatal hernia repair w/ Toupet fundoplication, EGD.

## 2022-05-06 NOTE — BRIEF OPERATIVE NOTE - COMMENTS
I first assisted through out the entire case, including port placement, bedside assist while the surgeon at the console, and wound closure, AMBROCIO [place]

## 2022-05-06 NOTE — BRIEF OPERATIVE NOTE - NSICDXBRIEFPROCEDURE_GEN_ALL_CORE_FT
PROCEDURES:  Robot-assisted laparoscopic repair of hiatal hernia with Toupet fundoplication 06-May-2022 10:51:44  Zaire Alexis

## 2022-05-07 ENCOUNTER — TRANSCRIPTION ENCOUNTER (OUTPATIENT)
Age: 67
End: 2022-05-07

## 2022-05-07 LAB
ANION GAP SERPL CALC-SCNC: 14 MMOL/L — SIGNIFICANT CHANGE UP (ref 7–14)
ANION GAP SERPL CALC-SCNC: 16 MMOL/L — HIGH (ref 7–14)
BASOPHILS # BLD AUTO: 0.01 K/UL — SIGNIFICANT CHANGE UP (ref 0–0.2)
BASOPHILS NFR BLD AUTO: 0.1 % — SIGNIFICANT CHANGE UP (ref 0–2)
BUN SERPL-MCNC: 11 MG/DL — SIGNIFICANT CHANGE UP (ref 7–23)
BUN SERPL-MCNC: 16 MG/DL — SIGNIFICANT CHANGE UP (ref 7–23)
CALCIUM SERPL-MCNC: 7.2 MG/DL — LOW (ref 8.4–10.5)
CALCIUM SERPL-MCNC: 9 MG/DL — SIGNIFICANT CHANGE UP (ref 8.4–10.5)
CHLORIDE SERPL-SCNC: 101 MMOL/L — SIGNIFICANT CHANGE UP (ref 98–107)
CHLORIDE SERPL-SCNC: 98 MMOL/L — SIGNIFICANT CHANGE UP (ref 98–107)
CO2 SERPL-SCNC: 17 MMOL/L — LOW (ref 22–31)
CO2 SERPL-SCNC: 27 MMOL/L — SIGNIFICANT CHANGE UP (ref 22–31)
CREAT SERPL-MCNC: 0.78 MG/DL — SIGNIFICANT CHANGE UP (ref 0.5–1.3)
CREAT SERPL-MCNC: 1.37 MG/DL — HIGH (ref 0.5–1.3)
EGFR: 57 ML/MIN/1.73M2 — LOW
EGFR: 98 ML/MIN/1.73M2 — SIGNIFICANT CHANGE UP
EOSINOPHIL # BLD AUTO: 0.01 K/UL — SIGNIFICANT CHANGE UP (ref 0–0.5)
EOSINOPHIL NFR BLD AUTO: 0.1 % — SIGNIFICANT CHANGE UP (ref 0–6)
GLUCOSE SERPL-MCNC: 58 MG/DL — LOW (ref 70–99)
GLUCOSE SERPL-MCNC: 91 MG/DL — SIGNIFICANT CHANGE UP (ref 70–99)
HCT VFR BLD CALC: 34 % — LOW (ref 39–50)
HCT VFR BLD CALC: 48.2 % — SIGNIFICANT CHANGE UP (ref 39–50)
HGB BLD-MCNC: 10.9 G/DL — LOW (ref 13–17)
HGB BLD-MCNC: 15.3 G/DL — SIGNIFICANT CHANGE UP (ref 13–17)
IANC: 6.82 K/UL — SIGNIFICANT CHANGE UP (ref 1.8–7.4)
IMM GRANULOCYTES NFR BLD AUTO: 0.3 % — SIGNIFICANT CHANGE UP (ref 0–1.5)
LYMPHOCYTES # BLD AUTO: 1.02 K/UL — SIGNIFICANT CHANGE UP (ref 1–3.3)
LYMPHOCYTES # BLD AUTO: 11.6 % — LOW (ref 13–44)
MAGNESIUM SERPL-MCNC: 1 MG/DL — CRITICAL LOW (ref 1.6–2.6)
MAGNESIUM SERPL-MCNC: 3.4 MG/DL — HIGH (ref 1.6–2.6)
MCHC RBC-ENTMCNC: 30.1 PG — SIGNIFICANT CHANGE UP (ref 27–34)
MCHC RBC-ENTMCNC: 30.4 PG — SIGNIFICANT CHANGE UP (ref 27–34)
MCHC RBC-ENTMCNC: 31.7 GM/DL — LOW (ref 32–36)
MCHC RBC-ENTMCNC: 32.1 GM/DL — SIGNIFICANT CHANGE UP (ref 32–36)
MCV RBC AUTO: 94.9 FL — SIGNIFICANT CHANGE UP (ref 80–100)
MCV RBC AUTO: 95 FL — SIGNIFICANT CHANGE UP (ref 80–100)
MONOCYTES # BLD AUTO: 0.94 K/UL — HIGH (ref 0–0.9)
MONOCYTES NFR BLD AUTO: 10.6 % — SIGNIFICANT CHANGE UP (ref 2–14)
NEUTROPHILS # BLD AUTO: 6.82 K/UL — SIGNIFICANT CHANGE UP (ref 1.8–7.4)
NEUTROPHILS NFR BLD AUTO: 77.3 % — HIGH (ref 43–77)
NRBC # BLD: 0 /100 WBCS — SIGNIFICANT CHANGE UP
NRBC # BLD: 0 /100 WBCS — SIGNIFICANT CHANGE UP
NRBC # FLD: 0 K/UL — SIGNIFICANT CHANGE UP
NRBC # FLD: 0 K/UL — SIGNIFICANT CHANGE UP
PHOSPHATE SERPL-MCNC: 2 MG/DL — LOW (ref 2.5–4.5)
PHOSPHATE SERPL-MCNC: 2.8 MG/DL — SIGNIFICANT CHANGE UP (ref 2.5–4.5)
PLATELET # BLD AUTO: 148 K/UL — LOW (ref 150–400)
PLATELET # BLD AUTO: 190 K/UL — SIGNIFICANT CHANGE UP (ref 150–400)
POTASSIUM SERPL-MCNC: 4.5 MMOL/L — SIGNIFICANT CHANGE UP (ref 3.5–5.3)
POTASSIUM SERPL-MCNC: 4.6 MMOL/L — SIGNIFICANT CHANGE UP (ref 3.5–5.3)
POTASSIUM SERPL-SCNC: 4.5 MMOL/L — SIGNIFICANT CHANGE UP (ref 3.5–5.3)
POTASSIUM SERPL-SCNC: 4.6 MMOL/L — SIGNIFICANT CHANGE UP (ref 3.5–5.3)
RBC # BLD: 3.58 M/UL — LOW (ref 4.2–5.8)
RBC # BLD: 5.08 M/UL — SIGNIFICANT CHANGE UP (ref 4.2–5.8)
RBC # FLD: 12.3 % — SIGNIFICANT CHANGE UP (ref 10.3–14.5)
RBC # FLD: 12.3 % — SIGNIFICANT CHANGE UP (ref 10.3–14.5)
SODIUM SERPL-SCNC: 134 MMOL/L — LOW (ref 135–145)
SODIUM SERPL-SCNC: 139 MMOL/L — SIGNIFICANT CHANGE UP (ref 135–145)
WBC # BLD: 11.56 K/UL — HIGH (ref 3.8–10.5)
WBC # BLD: 8.83 K/UL — SIGNIFICANT CHANGE UP (ref 3.8–10.5)
WBC # FLD AUTO: 11.56 K/UL — HIGH (ref 3.8–10.5)
WBC # FLD AUTO: 8.83 K/UL — SIGNIFICANT CHANGE UP (ref 3.8–10.5)

## 2022-05-07 PROCEDURE — 74220 X-RAY XM ESOPHAGUS 1CNTRST: CPT | Mod: 26

## 2022-05-07 PROCEDURE — 71045 X-RAY EXAM CHEST 1 VIEW: CPT | Mod: 26

## 2022-05-07 PROCEDURE — 99233 SBSQ HOSP IP/OBS HIGH 50: CPT

## 2022-05-07 RX ORDER — TRAMADOL HYDROCHLORIDE 50 MG/1
1 TABLET ORAL
Qty: 0 | Refills: 0 | DISCHARGE

## 2022-05-07 RX ORDER — OXYCODONE HYDROCHLORIDE 5 MG/1
5 TABLET ORAL EVERY 6 HOURS
Refills: 0 | Status: DISCONTINUED | OUTPATIENT
Start: 2022-05-07 | End: 2022-05-07

## 2022-05-07 RX ORDER — DEXTROSE 50 % IN WATER 50 %
25 SYRINGE (ML) INTRAVENOUS ONCE
Refills: 0 | Status: DISCONTINUED | OUTPATIENT
Start: 2022-05-07 | End: 2022-05-07

## 2022-05-07 RX ORDER — SODIUM CHLORIDE 9 MG/ML
1000 INJECTION, SOLUTION INTRAVENOUS
Refills: 0 | Status: DISCONTINUED | OUTPATIENT
Start: 2022-05-07 | End: 2022-05-07

## 2022-05-07 RX ORDER — MAGNESIUM SULFATE 500 MG/ML
2 VIAL (ML) INJECTION ONCE
Refills: 0 | Status: COMPLETED | OUTPATIENT
Start: 2022-05-07 | End: 2022-05-07

## 2022-05-07 RX ORDER — OXYCODONE HYDROCHLORIDE 5 MG/1
5 TABLET ORAL EVERY 6 HOURS
Refills: 0 | Status: DISCONTINUED | OUTPATIENT
Start: 2022-05-07 | End: 2022-05-08

## 2022-05-07 RX ORDER — MAGNESIUM SULFATE 500 MG/ML
2 VIAL (ML) INJECTION ONCE
Refills: 0 | Status: DISCONTINUED | OUTPATIENT
Start: 2022-05-07 | End: 2022-05-07

## 2022-05-07 RX ORDER — PANTOPRAZOLE SODIUM 20 MG/1
40 TABLET, DELAYED RELEASE ORAL
Refills: 0 | Status: DISCONTINUED | OUTPATIENT
Start: 2022-05-08 | End: 2022-05-08

## 2022-05-07 RX ORDER — OXYCODONE HYDROCHLORIDE 5 MG/1
10 TABLET ORAL EVERY 6 HOURS
Refills: 0 | Status: DISCONTINUED | OUTPATIENT
Start: 2022-05-07 | End: 2022-05-07

## 2022-05-07 RX ORDER — OXYCODONE HYDROCHLORIDE 5 MG/1
5 TABLET ORAL
Qty: 0 | Refills: 0 | DISCHARGE
Start: 2022-05-07

## 2022-05-07 RX ORDER — OXYCODONE HYDROCHLORIDE 5 MG/1
5 TABLET ORAL
Qty: 150 | Refills: 0
Start: 2022-05-07 | End: 2022-05-11

## 2022-05-07 RX ORDER — ACETAMINOPHEN 500 MG
650 TABLET ORAL EVERY 6 HOURS
Refills: 0 | Status: DISCONTINUED | OUTPATIENT
Start: 2022-05-07 | End: 2022-05-08

## 2022-05-07 RX ADMIN — HEPARIN SODIUM 5000 UNIT(S): 5000 INJECTION INTRAVENOUS; SUBCUTANEOUS at 05:29

## 2022-05-07 RX ADMIN — OXYCODONE HYDROCHLORIDE 5 MILLIGRAM(S): 5 TABLET ORAL at 17:42

## 2022-05-07 RX ADMIN — Medication 400 MILLIGRAM(S): at 21:00

## 2022-05-07 RX ADMIN — OXYCODONE HYDROCHLORIDE 5 MILLIGRAM(S): 5 TABLET ORAL at 12:00

## 2022-05-07 RX ADMIN — Medication 1000 MILLIGRAM(S): at 21:15

## 2022-05-07 RX ADMIN — HEPARIN SODIUM 5000 UNIT(S): 5000 INJECTION INTRAVENOUS; SUBCUTANEOUS at 21:48

## 2022-05-07 RX ADMIN — Medication 25 GRAM(S): at 05:30

## 2022-05-07 RX ADMIN — OXYCODONE HYDROCHLORIDE 5 MILLIGRAM(S): 5 TABLET ORAL at 12:30

## 2022-05-07 RX ADMIN — HEPARIN SODIUM 5000 UNIT(S): 5000 INJECTION INTRAVENOUS; SUBCUTANEOUS at 14:18

## 2022-05-07 RX ADMIN — OXYCODONE HYDROCHLORIDE 5 MILLIGRAM(S): 5 TABLET ORAL at 17:55

## 2022-05-07 NOTE — DISCHARGE NOTE NURSING/CASE MANAGEMENT/SOCIAL WORK - PATIENT PORTAL LINK FT
You can access the FollowMyHealth Patient Portal offered by [facility] by registering at the following website: [email or web address]. By joining [facility]’s FollowMyHealth portal, you will also be able to view your health information using other applications (apps) compatible with our system.

## 2022-05-07 NOTE — DISCHARGE NOTE PROVIDER - INSTRUCTIONS
Continue with a clear liquid diet for 2 days. If you can tolerate a clear liquid diet, then you may progress to a full liquid diet. Continue on a full liquid diet until you follow up with Dr. Hester. No carbonated beverages Continue with a clear liquid diet for 3 days. If you can tolerate a clear liquid diet, then you may progress to a full liquid diet. Continue on a full liquid diet until you follow up with Dr. Hester. No carbonated beverages.

## 2022-05-07 NOTE — DISCHARGE NOTE PROVIDER - NSDCFUADDINST_GEN_ALL_CORE_FT
Follow dietary instructions. You will be on a clear liquid diet for 3 days, followed by a full liquid diet until you follow up with Dr. Hester.  Please walk 4-5 x per day, Increase as tolerated. You may climb stairs. Continue to use incentive spirometer.   You may keep all wounds uncovered. Please shower daily.   See Dr. Hester's office in 2 weeks. Please call [phone] for an apt. Please get an CXR the day before your appointment and bring the CD with you to your follow up appointment.  Take pain pills only as needed. Please take a laxative to help support your bowel movements while on pain medication. Laxatives can be available over the counter at your local pharmacy.  Please call the office at [phone] if you have fever's chills, worsening shortness of breath, chest pain, warmth, redness or purulent discharge from the insertion site.

## 2022-05-07 NOTE — DISCHARGE NOTE PROVIDER - CARE PROVIDER_API CALL
Rito Hester)  Surgery; Thoracic Surgery  [id number] [place] [address], [place] [address]  Phone: [phone]  Fax: [phone]  Follow Up Time:

## 2022-05-07 NOTE — DISCHARGE NOTE PROVIDER - NSDCCPTREATMENT_GEN_ALL_CORE_FT
PRINCIPAL PROCEDURE  Procedure: Robot-assisted laparoscopic repair of hiatal hernia with Toupet fundoplication  Findings and Treatment:

## 2022-05-07 NOTE — DISCHARGE NOTE NURSING/CASE MANAGEMENT/SOCIAL WORK - NSDCPEFALRISK_GEN_ALL_CORE
For information on Fall & Injury Prevention, visit: [email or web address] OR  [email or web address] OR  https://www.cdc.gov/steadi/patient.html

## 2022-05-07 NOTE — DISCHARGE NOTE PROVIDER - NSDCMRMEDTOKEN_GEN_ALL_CORE_FT
omeprazole 40 mg oral delayed release capsule: 1 cap(s) orally once a day, am  traMADol 50 mg oral tablet: 1 tab(s) orally , As Needed  Wellbutrin: 300 milligram(s) orally once a day am   omeprazole 40 mg oral delayed release capsule: 1 cap(s) orally once a day, am  oxyCODONE 5 mg/5 mL oral solution: 5 milliliter(s) orally every 6 hours, As needed, Severe Pain (7 - 10)  Wellbutrin: 300 milligram(s) orally once a day am   CXR: CXR PA and Lateral Dx: ptx Please fax results to [phone].   omeprazole 40 mg oral delayed release capsule: 1 cap(s) orally once a day, am  oxyCODONE 5 mg/5 mL oral solution: 5 milliliter(s) orally every 4 hours, As Needed -Severe Pain (7 - 10) MDD:30mL  Wellbutrin: 300 milligram(s) orally once a day am   CXR: CXR PA and Lateral Dx: ptx Please fax results to [phone].   omeprazole 40 mg oral delayed release capsule: 1 cap(s) orally once a day, am  Wellbutrin: 300 milligram(s) orally once a day am

## 2022-05-07 NOTE — DISCHARGE NOTE PROVIDER - NSDCCPCAREPLAN_GEN_ALL_CORE_FT
PRINCIPAL DISCHARGE DIAGNOSIS  Diagnosis: Hiatal hernia  Assessment and Plan of Treatment:        PRINCIPAL DISCHARGE DIAGNOSIS  Diagnosis: Hiatal hernia  Assessment and Plan of Treatment: Please follow up with Dr. Hester in the office in 2 weeks. Please call and make an appointment. You may shower but remove all dressings first. Leave the white steri strips in place, they can get wet and they will fall off on their own. You are to eat a clear liquid diet for 3 days then advance yourself to a full liquid diet until you are seen in the office. Please remain active & ambulatory but refrain from any heavy lifting.

## 2022-05-07 NOTE — DISCHARGE NOTE PROVIDER - HOSPITAL COURSE
Pt is a 66M h/o cholecystectomy, appendectomy, nephrectomy, GERD, p/w hiatal hernia. Pt underwent an EGD robotic hiatal hernia repair, toupet fundoplication, on 5/6/22. Post op course was uncomplicated. Pt had a barium swallow on 5/7/22 which showed no leak. Pt cleared for discharge with a diet of clear liquids for 2 days and full liquids until follow up. Pt is a 66M h/o cholecystectomy, appendectomy, nephrectomy, GERD, p/w hiatal hernia. Pt underwent an EGD robotic hiatal hernia repair, toupet fundoplication, on 5/6/22. Post op course was uncomplicated. Pt had a barium swallow on 5/7/22 which showed no leak. Pt cleared for discharge with a diet of clear liquids for 3 days and full liquids until follow up.

## 2022-05-08 VITALS
HEART RATE: 90 BPM | SYSTOLIC BLOOD PRESSURE: 130 MMHG | OXYGEN SATURATION: 95 % | DIASTOLIC BLOOD PRESSURE: 72 MMHG | RESPIRATION RATE: 16 BRPM | TEMPERATURE: 98 F

## 2022-05-08 PROCEDURE — 99233 SBSQ HOSP IP/OBS HIGH 50: CPT

## 2022-05-08 RX ADMIN — PANTOPRAZOLE SODIUM 40 MILLIGRAM(S): 20 TABLET, DELAYED RELEASE ORAL at 05:25

## 2022-05-08 RX ADMIN — HEPARIN SODIUM 5000 UNIT(S): 5000 INJECTION INTRAVENOUS; SUBCUTANEOUS at 05:25

## 2022-05-08 NOTE — PATIENT PROFILE ADULT - FALL HARM RISK - UNIVERSAL INTERVENTIONS
Bed in lowest position, wheels locked, appropriate side rails in place/Call bell, personal items and telephone in reach/Instruct patient to call for assistance before getting out of bed or chair/Non-slip footwear when patient is out of bed/[place] to call system/Physically safe environment - no spills, clutter or unnecessary equipment/Purposeful Proactive Rounding/Room/bathroom lighting operational, light cord in reach

## 2022-05-13 PROBLEM — K44.9 DIAPHRAGMATIC HERNIA WITHOUT OBSTRUCTION OR GANGRENE: Chronic | Status: ACTIVE | Noted: 2022-04-20

## 2022-05-13 LAB — SURGICAL PATHOLOGY STUDY: SIGNIFICANT CHANGE UP

## 2022-05-24 ENCOUNTER — RESULT REVIEW (OUTPATIENT)
Age: 67
End: 2022-05-24

## 2022-05-24 ENCOUNTER — APPOINTMENT (OUTPATIENT)
Dept: RADIOLOGY | Facility: HOSPITAL | Age: 67
End: 2022-05-24

## 2022-05-24 ENCOUNTER — OUTPATIENT (OUTPATIENT)
Dept: OUTPATIENT SERVICES | Facility: HOSPITAL | Age: 67
LOS: 1 days | End: 2022-05-24
Payer: COMMERCIAL

## 2022-05-24 ENCOUNTER — APPOINTMENT (OUTPATIENT)
Dept: THORACIC SURGERY | Facility: CLINIC | Age: 67
End: 2022-05-24
Payer: COMMERCIAL

## 2022-05-24 VITALS
RESPIRATION RATE: 15 BRPM | HEIGHT: 60 IN | HEART RATE: 51 BPM | BODY MASS INDEX: 25.52 KG/M2 | OXYGEN SATURATION: 97 % | WEIGHT: 130 LBS | DIASTOLIC BLOOD PRESSURE: 77 MMHG | SYSTOLIC BLOOD PRESSURE: 114 MMHG

## 2022-05-24 DIAGNOSIS — K44.9 DIAPHRAGMATIC HERNIA WITHOUT OBSTRUCTION OR GANGRENE: ICD-10-CM

## 2022-05-24 DIAGNOSIS — Z98.89 OTHER SPECIFIED POSTPROCEDURAL STATES: Chronic | ICD-10-CM

## 2022-05-24 DIAGNOSIS — Z90.5 ACQUIRED ABSENCE OF KIDNEY: Chronic | ICD-10-CM

## 2022-05-24 DIAGNOSIS — Z90.49 ACQUIRED ABSENCE OF OTHER SPECIFIED PARTS OF DIGESTIVE TRACT: Chronic | ICD-10-CM

## 2022-05-24 PROCEDURE — 99024 POSTOP FOLLOW-UP VISIT: CPT

## 2022-05-24 PROCEDURE — 71046 X-RAY EXAM CHEST 2 VIEWS: CPT | Mod: 26

## 2022-06-21 ENCOUNTER — APPOINTMENT (OUTPATIENT)
Dept: THORACIC SURGERY | Facility: CLINIC | Age: 67
End: 2022-06-21

## 2022-06-23 ENCOUNTER — APPOINTMENT (OUTPATIENT)
Dept: RADIOLOGY | Facility: HOSPITAL | Age: 67
End: 2022-06-23
Payer: COMMERCIAL

## 2022-06-23 ENCOUNTER — OUTPATIENT (OUTPATIENT)
Dept: OUTPATIENT SERVICES | Facility: HOSPITAL | Age: 67
LOS: 1 days | End: 2022-06-23

## 2022-06-23 DIAGNOSIS — Z90.49 ACQUIRED ABSENCE OF OTHER SPECIFIED PARTS OF DIGESTIVE TRACT: Chronic | ICD-10-CM

## 2022-06-23 DIAGNOSIS — Z98.89 OTHER SPECIFIED POSTPROCEDURAL STATES: Chronic | ICD-10-CM

## 2022-06-23 DIAGNOSIS — K44.9 DIAPHRAGMATIC HERNIA WITHOUT OBSTRUCTION OR GANGRENE: ICD-10-CM

## 2022-06-23 DIAGNOSIS — Z90.5 ACQUIRED ABSENCE OF KIDNEY: Chronic | ICD-10-CM

## 2022-06-23 PROCEDURE — 74220 X-RAY XM ESOPHAGUS 1CNTRST: CPT | Mod: 26

## 2022-06-28 ENCOUNTER — APPOINTMENT (OUTPATIENT)
Dept: THORACIC SURGERY | Facility: CLINIC | Age: 67
End: 2022-06-28

## 2022-06-28 PROCEDURE — 99024 POSTOP FOLLOW-UP VISIT: CPT

## 2022-09-02 NOTE — PROGRESS NOTE ADULT - SUBJECTIVE AND OBJECTIVE BOX
Anesthesia Pain Management Service    SUBJECTIVE: Patient is doing well with IV PCA and no significant problems reported.    Pain Scale Score	At rest: _5/10__ 	With Activity: ___ 	[X ] Refer to charted pain scores    THERAPY:    [ ] IV PCA Morphine		[ ] 5 mg/mL	[ ] 1 mg/mL  [X ] IV PCA Hydromorphone	[ ] 5 mg/mL	[X ] 1 mg/mL  [ ] IV PCA Fentanyl		[ ] 50 micrograms/mL    Demand dose __0.2_ lockout __6_ (minutes) Continuous Rate _0__ Total: _3.10__  mg used (in past 24 hours)      MEDICATIONS  (STANDING):  acetaminophen   IVPB .. 1000 milliGRAM(s) IV Intermittent once  dextrose 5% + lactated ringers. 1000 milliLiter(s) (100 mL/Hr) IV Continuous <Continuous>  dextrose 50% Injectable 25 milliLiter(s) IV Push once  heparin   Injectable 5000 Unit(s) SubCutaneous every 8 hours  HYDROmorphone PCA (1 mG/mL) 30 milliLiter(s) PCA Continuous PCA Continuous  pantoprazole  Injectable 40 milliGRAM(s) IV Push once    MEDICATIONS  (PRN):  HYDROmorphone PCA (1 mG/mL) Rescue Clinician Bolus 0.5 milliGRAM(s) IV Push every 15 minutes PRN for Pain Scale GREATER THAN 6  naloxone Injectable 0.1 milliGRAM(s) IV Push every 3 minutes PRN For ANY of the following changes in patient status:  A. RR LESS THAN 10 breaths per minute, B. Oxygen saturation LESS THAN 90%, C. Sedation score of 6  ondansetron Injectable 4 milliGRAM(s) IV Push every 6 hours PRN Nausea      OBJECTIVE: Patient sitting in chair.    Sedation Score:	[ X] Alert	[ ] Drowsy 	[ ] Arousable	[ ] Asleep	[ ] Unresponsive    Side Effects:	[X ] None	[ ] Nausea	[ ] Vomiting	[ ] Pruritus  		[ ] Other:    Vital Signs Last 24 Hrs  T(C): 37.1 (07 May 2022 08:00), Max: 37.2 (06 May 2022 16:00)  T(F): 98.8 (07 May 2022 08:00), Max: 98.9 (06 May 2022 16:00)  HR: 83 (07 May 2022 09:00) (78 - 101)  BP: 128/71 (07 May 2022 09:00) (102/65 - 145/81)  BP(mean): 86 (07 May 2022 09:00) (74 - 104)  RR: 21 (07 May 2022 09:00) (10 - 25)  SpO2: 95% (07 May 2022 09:00) (85% - 98%)    ASSESSMENT/ PLAN    Therapy to  be:	[ X] Continue   [ ] Discontinued   [ ] Change to prn Analgesics    Documentation and Verification of current medications:   [X] Done	[ ] Not done, not elligible    Comments: Continue IV PCA. Recommend non-opioid adjuvant analgesics to be used when possible and when allowed by primary surgical team.    Progress Note written now but Patient was seen earlier.
TROY OBRIEN                     [id number]    HPI: 65 y/o male with hx of GERD, and hx of back pain  after eating.  Dx with hiatal hernia. Scheduled for Upper Esophagogastroduodenoscopy redo robotic assisted laparoscopic repair of hiatal hernia       PROCEDURES: robotic assisted laparoscopic, lysis of adhesions, hiatal hernia repair w/ Toupet fundoplication 06-May-2022       ISSUES:   Hiatal hernia  GERD  Post op pain  CKD (baseline Cr 1.3)  s/p L nephrectomy (donated for transplant)  S/p appendectomy  s/p cholecystectomy     PAST MEDICAL & SURGICAL HISTORY:  IBS (irritable bowel syndrome)    GERD (gastroesophageal reflux disease)    Cholecystitis chronic, acute    Hernia, hiatal    S/P appendectomy    S/P cholecystectomy    H/O kidney removal  left for donor              VITAL SIGNS:  Vital Signs Last 24 Hrs  T(C): 36.7 (08 May 2022 08:00), Max: 37 (07 May 2022 20:00)  T(F): 98 (08 May 2022 08:00), Max: 98.6 (07 May 2022 20:00)  HR: 90 (08 May 2022 08:00) (77 - 90)  BP: 130/72 (08 May 2022 08:00) (124/63 - 134/70)  BP(mean): 82 (08 May 2022 08:00) (77 - 86)  RR: 16 (08 May 2022 08:00) (14 - 26)  SpO2: 95% (08 May 2022 08:00) (93% - 96%)    I/Os:   I&O's Detail    07 May 2022 07:01  -  08 May 2022 07:00  --------------------------------------------------------  IN:    dextrose 5% + lactated ringers: 200 mL    Lactated Ringers: 100 mL  Total IN: 300 mL    OUT:    Voided (mL): 1750 mL  Total OUT: 1750 mL    Total NET: -1450 mL          CAPILLARY BLOOD GLUCOSE          =======================MEDICATIONS===================  MEDICATIONS  (STANDING):  heparin   Injectable 5000 Unit(s) SubCutaneous every 8 hours  pantoprazole    Tablet 40 milliGRAM(s) Oral before breakfast    MEDICATIONS  (PRN):  acetaminophen     Tablet .. 650 milliGRAM(s) Oral every 6 hours PRN Mild Pain (1 - 3)  naloxone Injectable 0.1 milliGRAM(s) IV Push every 3 minutes PRN For ANY of the following changes in patient status:  A. RR LESS THAN 10 breaths per minute, B. Oxygen saturation LESS THAN 90%, C. Sedation score of 6  ondansetron Injectable 4 milliGRAM(s) IV Push every 6 hours PRN Nausea  oxyCODONE    Solution 5 milliGRAM(s) Oral every 6 hours PRN Severe Pain (7 - 10)      PHYSICAL EXAM============================  General:                         Awake, alert, not in any distress  Neuro:                            Moving all extremities to commands.   Respiratory:	Air entry fair and  bilateral conducted sounds                                           Effort even and unlabored.  CV:		Regular rate and rhythm. Normal S1/S2                                          Distal pulses present.  Abdomen:	                     Soft, non-distended. Bowel sounds present   Skin:		No rash.  Extremities:	Warm, no cyanosis or edema.  Palpable pulses    ============================LABS=========================                        15.3   11.56 )-----------( 190      ( 07 May 2022 10:07 )             48.2     05-07    139  |  98  |  16  ----------------------------<  91  4.5   |  27  |  1.37<H>    Ca    9.0      07 May 2022 10:07  Phos  2.8     05-07  Mg     3.40     05-07        =============================NEUROLOGY============================  Pain control with PCA / Tylenol IV    ==============================RESPIRATORY========================  Stable on room air - Incentive spirometry. Chest PT and suctioning of secretions. Out of bed to chair and ambulate with assistance. Continuous pulse oximetry for support & to prevent decompensation.Pt is on  2 L nasal canula   Comfortable, not in any distress.  Using incentive spirometry 	  Continue bronchodilators, pulmonary toilet    ============================CARDIOVASCULAR======================  Continue hemodynamic monitoring.  Not on any pressors    =====================RENAL===================  Continue LR   Monitor I/Os and electrolytes    ====================GASTROINTESTINAL===================  Tolerating PO  Continue GI prophylaxis with  Protonix  Continue Zofran / Reglan for nausea - PRN	    ========================HEMATOLOGIC/ONCOLOGIC====================  Monitor chest tube output. No signs of active bleeding.   Follow CBC in AM    ============================INFECTIOUS DISEASE========================  Monitor for fever / leukocytosis.  All surgical incision / chest tube  sites look clean      Pt is on GI & DVT prophylaxis  OOB & ambulate       Pertinent clinical, laboratory, radiographic, hemodynamic, echocardiographic, respiratory data, microbiologic data and chart were reviewed and analyzed frequently throughout the course of the day and night  Patient seen, examined and plan discussed with CT Surgery / CTICU team during rounds.    Pt's status discussed with family at bedside, updated status    Rabia Cespedes DO FACEP    
TROY OBRIEN                     [id number]    HPI: 67 y/o male with hx of GERD, and hx of back pain  after eating.  Dx with hiatal hernia. Scheduled for Upper Esophagogastroduodenoscopy redo robotic assisted laparoscopic repair of hiatal hernia       Procedure: Redo robotic assisted laparoscopic repair of hiatal hernia     Issues:  hiatal hernia   GERD  Post op pain        PAST MEDICAL & SURGICAL HISTORY:  IBS (irritable bowel syndrome)    GERD (gastroesophageal reflux disease)    Cholecystitis chronic, acute    Hernia, hiatal    S/P appendectomy    S/P cholecystectomy    H/O kidney removal  left for donor              VITAL SIGNS:  Vital Signs Last 24 Hrs  T(C): 36.6 (06 May 2022 05:56), Max: 36.6 (06 May 2022 05:56)  T(F): 97.8 (06 May 2022 05:56), Max: 97.8 (06 May 2022 05:56)  HR: 75 (06 May 2022 05:56) (75 - 75)  BP: 131/89 (06 May 2022 05:56) (131/89 - 131/89)  BP(mean): --  RR: 16 (06 May 2022 05:56) (16 - 16)  SpO2: 97% (06 May 2022 05:56) (97% - 97%)    I/Os:   I&O's Detail      CAPILLARY BLOOD GLUCOSE          =======================MEDICATIONS===================  MEDICATIONS  (STANDING):  HYDROmorphone PCA (1 mG/mL) 30 milliLiter(s) PCA Continuous PCA Continuous  lactated ringers. 1000 milliLiter(s) (30 mL/Hr) IV Continuous <Continuous>    MEDICATIONS  (PRN):  HYDROmorphone PCA (1 mG/mL) Rescue Clinician Bolus 0.5 milliGRAM(s) IV Push every 15 minutes PRN for Pain Scale GREATER THAN 6  naloxone Injectable 0.1 milliGRAM(s) IV Push every 3 minutes PRN For ANY of the following changes in patient status:  A. RR LESS THAN 10 breaths per minute, B. Oxygen saturation LESS THAN 90%, C. Sedation score of 6  ondansetron Injectable 4 milliGRAM(s) IV Push every 6 hours PRN Nausea        PHYSICAL EXAM============================  General:                         Awake, alert, not in any distress  Neuro:                            Moving all extremities to commands.   Respiratory:	Air entry fair and  bilateral conducted sounds                                           Effort even and unlabored.  CV:		Regular rate and rhythm. Normal S1/S2                                          Distal pulses present.  Abdomen:	                     Soft, non-distended. Bowel sounds present   Skin:		No rash.  Extremities:	Warm, no cyanosis or edema.  Palpable pulses      =============================NEUROLOGY============================  Pain control with PCA / Tylenol IV    ==============================RESPIRATORY========================  Pt is on  2 L nasal canula   Comfortable, not in any distress.  Using incentive spirometry 	  Continue bronchodilators, pulmonary toilet    ============================CARDIOVASCULAR======================  Continue hemodynamic monitoring.  Not on any pressors    =====================RENAL===================  Continue LR   Monitor I/Os and electrolytes    ====================GASTROINTESTINAL===================  NPO, IVF, barium study in am  Continue GI prophylaxis with  Protonix  Continue Zofran / Reglan for nausea - PRN	    ========================HEMATOLOGIC/ONCOLOGIC====================  Monitor chest tube output. No signs of active bleeding.   Follow CBC in AM    ============================INFECTIOUS DISEASE========================  Monitor for fever / leukocytosis.  All surgical incision / chest tube  sites look clean      Pt is on GI & DVT prophylaxis  OOB & ambulate       Pertinent clinical, laboratory, radiographic, hemodynamic, echocardiographic, respiratory data, microbiologic data and chart were reviewed and analyzed frequently throughout the course of the day and night  Patient seen, examined and plan discussed with CT Surgery / CTICU team during rounds.    Pt's status discussed with family at bedside, updated status        Rabia Cespedes DO FACEP    
    CHIEF COMPLAINT: FOLLOW UP IN ICU FOR POSTOPERATIVE CARE OF PATIENT WHO IS S/P HIATAL HERNIA REPAIR      PROCEDURES: robotic assisted laparoscopic, lysis of adhesions, hiatal hernia repair w/ Toupet fundoplication 06-May-2022       ISSUES:   Hiatal hernia  GERD  Post op pain  CKD (baseline Cr 1.3)  s/p L nephrectomy (donated for transplant)  S/p appendectomy  s/p cholecystectomy     INTERVAL EVENTS:   Swallow test with no leak.      HISTORY:   Patient reports mild sharp pain at surgical sites which is improved with use of pain meds and worse with deep breathing and movement. There is no associated fever, cough, nausea, or vomiting.    PHYSICAL EXAM:   Gen: Comfortable, No acute distress  Eyes: Sclera white, Conjunctiva normal, Eyelids normal, Pupils symmetrical   ENT: Mucous membranes moist,  ,  ,    Neck: Trachea midline,  ,  ,  ,  ,  ,    CV: Rate regular, Rhythm regular,  ,  ,    Resp: Breath sounds clear, No accessory muscles use,  ,  ,    Abd: Soft, Non-distended, Non-tender, Bowel sounds normal,  ,  ,    Skin: Warm, No peripheral edema of lower extremities,  ,    : No olivarez  Neuro: Moving all 4 extremities,    Psych: A&Ox3      ASSESSMENT AND PLAN:     NEURO:  Post-operative Pain - Stable. Pain control with oxycodone PO      RESPIRATORY:  Stable on room air - Incentive spirometry. Chest PT and suctioning of secretions. Out of bed to chair and ambulate with assistance. Continuous pulse oximetry for support & to prevent decompensation.         CARDIOVASCULAR:  Hemodynamically stable - Not on pressors. Continue hemodynamic monitoring.  Telemetry (medical test) - Reviewed by me today independently. Normal sinus rhythm.       RENAL:  CKD – Stable. Renally dose medications. Monitor for hyperkalemia and uremia. Avoid nephrotoxic medications. Monitor IOs and electrolytes.        GASTROINTESTINAL:  GI prophylaxis not indicated  Zofran and Reglan IV PRN for nausea  Clear liquid diet        GERD - stable. Continue pantoprazole  Hiatal hernia - Improved after surgery            HEMATOLOGIC:  No signs of active bleeding. Monitor Hgb in CBC in AM  DVT prophylaxis with heparin subQ and SCDs.           INFECTIOUS DISEASE:  All surgical sites appear clean. Will monitor for fever and leukocytosis.           ENDOCRINE:  Stable – Monitor glucose fingersticks for goal 120-180.           Pertinent clinical, laboratory, radiographic, hemodynamic, echocardiographic, respiratory data, microbiologic data and chart were reviewed by myself and analyzed frequently throughout the course of the day and night by myself.    Plan discussed at length with the CTICU staff and Attending CT Surgeon -   Dr Mesha Porras.       Patient's status was discussed with patient at bedside.       ________________________________________________    _________________________  VITAL SIGNS:  Vital Signs Last 24 Hrs  T(C): 37.1 (07 May 2022 08:00), Max: 37.2 (06 May 2022 16:00)  T(F): 98.8 (07 May 2022 08:00), Max: 98.9 (06 May 2022 16:00)  HR: 86 (07 May 2022 12:00) (77 - 101)  BP: 133/72 (07 May 2022 12:00) (104/82 - 138/72)  BP(mean): 86 (07 May 2022 12:00) (77 - 102)  RR: 26 (07 May 2022 12:00) (10 - 26)  SpO2: 96% (07 May 2022 12:00) (93% - 98%)  I/Os:   I&O's Detail    06 May 2022 07:01  -  07 May 2022 07:00  --------------------------------------------------------  IN:    IV PiggyBack: 50 mL    Lactated Ringers: 2000 mL  Total IN: 2050 mL    OUT:    Voided (mL): 2000 mL  Total OUT: 2000 mL    Total NET: 50 mL      07 May 2022 07:01  -  07 May 2022 12:59  --------------------------------------------------------  IN:    dextrose 5% + lactated ringers: 200 mL    Lactated Ringers: 100 mL  Total IN: 300 mL    OUT:    Voided (mL): 350 mL  Total OUT: 350 mL    Total NET: -50 mL              MEDICATIONS:  MEDICATIONS  (STANDING):  acetaminophen   IVPB .. 1000 milliGRAM(s) IV Intermittent once  heparin   Injectable 5000 Unit(s) SubCutaneous every 8 hours    MEDICATIONS  (PRN):  acetaminophen     Tablet .. 650 milliGRAM(s) Oral every 6 hours PRN Mild Pain (1 - 3)  naloxone Injectable 0.1 milliGRAM(s) IV Push every 3 minutes PRN For ANY of the following changes in patient status:  A. RR LESS THAN 10 breaths per minute, B. Oxygen saturation LESS THAN 90%, C. Sedation score of 6  ondansetron Injectable 4 milliGRAM(s) IV Push every 6 hours PRN Nausea  oxyCODONE    IR 5 milliGRAM(s) Oral every 6 hours PRN Moderate Pain (4 - 6)  oxyCODONE    IR 10 milliGRAM(s) Oral every 6 hours PRN Severe Pain (7 - 10)  oxyCODONE    Solution 5 milliGRAM(s) Oral every 6 hours PRN Severe Pain (7 - 10)      LABS:  Laboratory data was independently reviewed by me today.                           15.3   11.56 )-----------( 190      ( 07 May 2022 10:07 )             48.2     05-07    139  |  98  |  16  ----------------------------<  91  4.5   |  27  |  1.37<H>    Ca    9.0      07 May 2022 10:07  Phos  2.8     05-07  Mg     3.40     05-07                RADIOLOGY:   Radiology images were independently reviewed by me today. Reports were reviewed by me today.    Xray Chest 1 View- PORTABLE-Urgent:   ACC: [id number] EXAM:  XR CHEST PORTABLE URGENT 1V                          PROCEDURE DATE:  05/06/2022          INTERPRETATION:  Indication: Status post hiatal hernia repair.    TECHNIQUE: Single portable view of the chest    COMPARISON: None.    FINDINGS: The cardiac silhouette is normal in size. There is a focal   airspace opacity at the right lung base, likely representing atelectasis.   There are no pleural effusions. There is probable pneumomediastinum.   There is trace free air beneath theright hemidiaphragm, likely   pneumoperitoneum. There is extensive subcutaneous emphysema in both chest   walls and extending up into the neck.    IMPRESSION:  Probable pneumomediastinum, mild pneumoperitoneum, and extensive   subcutaneous emphysema.Focal airspace opacity at the right lung base,   likely represents atelectasis and less likely right basilar pneumonia.        --- End of Report ---            NERY GAUTHIER MD; Attending Radiologist  This document has been electronically signed. May  6 2022  3:12PM (05-06-22 @ 12:16)    
Anesthesia Pain Management Service    SUBJECTIVE: Patient is doing well with IV PCA and no significant problems reported. States pain is worse with movement but is manageable at rest    Pain Scale Score	At rest: _5/10__ 	With Activity: _8-9/10_ 	[X ] Refer to charted pain scores    THERAPY:    [ ] IV PCA Morphine		[ ] 5 mg/mL	[ ] 1 mg/mL  [X ] IV PCA Hydromorphone	[ ] 5 mg/mL	[X ] 1 mg/mL  [ ] IV PCA Fentanyl		[ ] 50 micrograms/mL    Demand dose __0.2_ lockout __6_ (minutes) Continuous Rate _0__ Total: _3.10__  mg used (in past 24 hours)      MEDICATIONS  (STANDING):  acetaminophen   IVPB .. 1000 milliGRAM(s) IV Intermittent once  dextrose 5% + lactated ringers. 1000 milliLiter(s) (100 mL/Hr) IV Continuous <Continuous>  dextrose 50% Injectable 25 milliLiter(s) IV Push once  heparin   Injectable 5000 Unit(s) SubCutaneous every 8 hours  HYDROmorphone PCA (1 mG/mL) 30 milliLiter(s) PCA Continuous PCA Continuous  pantoprazole  Injectable 40 milliGRAM(s) IV Push once    MEDICATIONS  (PRN):  HYDROmorphone PCA (1 mG/mL) Rescue Clinician Bolus 0.5 milliGRAM(s) IV Push every 15 minutes PRN for Pain Scale GREATER THAN 6  naloxone Injectable 0.1 milliGRAM(s) IV Push every 3 minutes PRN For ANY of the following changes in patient status:  A. RR LESS THAN 10 breaths per minute, B. Oxygen saturation LESS THAN 90%, C. Sedation score of 6  ondansetron Injectable 4 milliGRAM(s) IV Push every 6 hours PRN Nausea      OBJECTIVE: Patient sitting in chair.    Sedation Score:	[ X] Alert	[ ] Drowsy 	[ ] Arousable	[ ] Asleep	[ ] Unresponsive    Side Effects:	[X ] None	[ ] Nausea	[ ] Vomiting	[ ] Pruritus  		[ ] Other:    Vital Signs Last 24 Hrs  T(C): 37.1 (07 May 2022 08:00), Max: 37.2 (06 May 2022 16:00)  T(F): 98.8 (07 May 2022 08:00), Max: 98.9 (06 May 2022 16:00)  HR: 83 (07 May 2022 09:00) (78 - 101)  BP: 128/71 (07 May 2022 09:00) (102/65 - 145/81)  BP(mean): 86 (07 May 2022 09:00) (74 - 104)  RR: 21 (07 May 2022 09:00) (10 - 25)  SpO2: 95% (07 May 2022 09:00) (85% - 98%)    ASSESSMENT/ PLAN    Therapy to  be:	[ X] Continue   [ ] Discontinued   [ ] Change to prn Analgesics    Documentation and Verification of current medications:   [X] Done	[ ] Not done, not elligible    Comments: Continue IV PCA. Recommend non-opioid adjuvant analgesics to be used when possible and when allowed by primary surgical team.    Progress Note written now but Patient was seen earlier.  
POST ANESTHESIA EVALUATION    66y Male POSTOP DAY 1 S/P     MENTAL STATUS: Patient participation [ X ] Awake     [  ] Arousable     [  ] Sedated    AIRWAY PATENCY: [X  ] Satisfactory  [  ] Other:     Vital Signs Last 24 Hrs  T(C): 37.1 (07 May 2022 08:00), Max: 37.2 (06 May 2022 16:00)  T(F): 98.8 (07 May 2022 08:00), Max: 98.9 (06 May 2022 16:00)  HR: 83 (07 May 2022 09:00) (78 - 101)  BP: 128/71 (07 May 2022 09:00) (102/65 - 144/84)  BP(mean): 86 (07 May 2022 09:00) (74 - 104)  RR: 21 (07 May 2022 09:00) (10 - 25)  SpO2: 95% (07 May 2022 09:00) (85% - 98%)  I&O's Summary    06 May 2022 07:01  -  07 May 2022 07:00  --------------------------------------------------------  IN: 2050 mL / OUT: 2000 mL / NET: 50 mL    07 May 2022 07:01  -  07 May 2022 10:45  --------------------------------------------------------  IN: 100 mL / OUT: 350 mL / NET: -250 mL          NAUSEA/ VOMITTING:  [ X ] NONE  [  ] CONTROLLED [  ] OTHER     PAIN: [ X ] CONTROLLED WITH CURRENT REGIMEN  [  ] OTHER    [ X ] NO APPARENT ANESTHESIA COMPLICATIONS      Comments: 
Calm/Appropriate

## 2023-01-24 ENCOUNTER — OUTPATIENT (OUTPATIENT)
Dept: OUTPATIENT SERVICES | Facility: HOSPITAL | Age: 68
LOS: 1 days | End: 2023-01-24
Payer: COMMERCIAL

## 2023-01-24 ENCOUNTER — APPOINTMENT (OUTPATIENT)
Dept: THORACIC SURGERY | Facility: CLINIC | Age: 68
End: 2023-01-24
Payer: COMMERCIAL

## 2023-01-24 ENCOUNTER — APPOINTMENT (OUTPATIENT)
Dept: RADIOLOGY | Facility: HOSPITAL | Age: 68
End: 2023-01-24

## 2023-01-24 VITALS
BODY MASS INDEX: 25.52 KG/M2 | OXYGEN SATURATION: 95 % | HEIGHT: 60 IN | WEIGHT: 130 LBS | SYSTOLIC BLOOD PRESSURE: 136 MMHG | DIASTOLIC BLOOD PRESSURE: 82 MMHG | HEART RATE: 68 BPM

## 2023-01-24 DIAGNOSIS — Z90.49 ACQUIRED ABSENCE OF OTHER SPECIFIED PARTS OF DIGESTIVE TRACT: Chronic | ICD-10-CM

## 2023-01-24 DIAGNOSIS — Z90.5 ACQUIRED ABSENCE OF KIDNEY: Chronic | ICD-10-CM

## 2023-01-24 DIAGNOSIS — Z98.89 OTHER SPECIFIED POSTPROCEDURAL STATES: Chronic | ICD-10-CM

## 2023-01-24 DIAGNOSIS — K44.9 DIAPHRAGMATIC HERNIA WITHOUT OBSTRUCTION OR GANGRENE: ICD-10-CM

## 2023-01-24 PROCEDURE — 74220 X-RAY XM ESOPHAGUS 1CNTRST: CPT | Mod: 26

## 2023-01-24 PROCEDURE — 99214 OFFICE O/P EST MOD 30 MIN: CPT

## 2023-01-24 RX ORDER — BUPROPION HYDROCHLORIDE 100 MG/1
TABLET, FILM COATED ORAL
Refills: 0 | Status: ACTIVE | COMMUNITY

## 2023-01-24 RX ORDER — ASPIRIN 81 MG
81 TABLET, DELAYED RELEASE (ENTERIC COATED) ORAL
Refills: 0 | Status: ACTIVE | COMMUNITY

## 2023-01-24 RX ORDER — OMEPRAZOLE 20 MG/1
TABLET, DELAYED RELEASE ORAL
Refills: 0 | Status: ACTIVE | COMMUNITY

## 2023-01-24 RX ORDER — ATORVASTATIN CALCIUM 80 MG/1
TABLET, FILM COATED ORAL
Refills: 0 | Status: ACTIVE | COMMUNITY

## 2023-01-24 NOTE — DATA REVIEWED
[FreeTextEntry1] : Independently reviewed the following:\par - Barium Esophagram on 6/23/22\par - Barium Esophagram today

## 2023-01-24 NOTE — HISTORY OF PRESENT ILLNESS
[FreeTextEntry1] : Mr. TROY OBRIEN, 67 year old male, never a smoker, w/ hx of Appendectomy and nephrectomy (Kidney donation); Severe reflux, Zenker's diverticulum and large hiatal hernia Referred by Dr. Goldsmith for further evaluation and treatment.\par \par  Esophagram and GI series on 1/5/22: \par - Zenker's diverticulum unchanged from the prior study measuring 6 mm\par - This fills and empties with associated vigorous cricopharyngeal contraction which relaxes rapidly\par - Sliding hiatal hernia\par \par CT Chest on 1/31/22:\par - KNown Zenker's diverticulum is not visible on CT\par - Contrast within the esophagus which may be due to retention or reflux\par \par Endoscopy and BRAVO on 3/9/22: LA grade B reflux esophagitis in setting of 4 cm HH. Few gastric polyps Path demonstrating chronic inactive gastritis; No Helicobacter microorganisms identified; No evidence of intestinal metaplasia. DeMeester score 41.7; Positive pH study OFF therapy. \par \par Manometry on 3/16/22: \par - Ineffective esophageal motility with adequate peristaltic reserve\par \par Now, s/p Upper endoscopy, redo robotic-assisted lysis of adhesions, repair of hiatal  hernia, and Toupet fundoplication on 5/6/22. Path of hernia sac: Benign fibroconnective and adipose tissue consistent with hernia\par sac. Path of GE junction: Benign adipose tissue with congested blood vessels and reactive lymph nodes\par \par Barium Esophagram on 6/23/22: \par - Esophagus showed normal contour and motility\par - Contrast passed unimpeded through the postoperative gastroesophageal junction into a normal appearing stomach.\par - Barium tablet was swallowed and remained at the GE junction on delayed imaging.\par \par Barium esophagram on 01/24/2023 reviewed.\par \par Here today for follow up. He reports to be doing well, denies any difficulty swallowing, bloating, pain, shortness of breath, or cough. Tolerating regular diet.

## 2023-01-24 NOTE — CONSULT LETTER
[Dear  ___] : Dear  [unfilled], [Courtesy Letter:] : I had the pleasure of seeing your patient, [unfilled], in my office today. [Please see my note below.] : Please see my note below. [Sincerely,] : Sincerely, [FreeTextEntry2] : Dr. Agus Delgado () \par  [FreeTextEntry3] : Rito Hester MD, FACS \par Chief, Division of Thoracic Surgery \par Director, Minimally Invasive Thoracic Surgery \par Department of Cardiovascular and Thoracic Surgery \par [facility] \par , Cardiovascular and Thoracic Surgery\par \par \par

## 2023-01-24 NOTE — ASSESSMENT
[FreeTextEntry1] : Mr. TROY OBRIEN, 67 year old male, never a smoker, w/ hx of Appendectomy and nephrectomy (Kidney donation); Severe reflux, Zenker's diverticulum and large hiatal hernia Referred by Dr. Goldsmith for further evaluation and treatment.\par \par Now, s/p Upper endoscopy, redo robotic-assisted lysis of adhesions, repair of hiatal  hernia, and Toupet fundoplication on 5/6/22. Path of hernia sac: Benign fibroconnective and adipose tissue consistent with hernia\par sac. Path of GE junction: Benign adipose tissue with congested blood vessels and reactive lymph nodes\par \par Here today w/ Barium Esophagram for follow up. \par \par I have independently reviewed the medical records and imaging at the time of this office consultation. Esophargram reviewed with patient, stable findings. I recommend return to office in 6 months with repeat barium esophagram. Continue follow up with GI.\par \par Recommendations reviewed with patient during this office visit, and all questions answered; Patient instructed on the importance of follow up and verbalizes understanding. \par \par \par I, BRIANNA Rothman, personally performed the evaluation and management (E/M) services for this established patient. That E/M includes conducting the examination, assessing all new/exacerbated conditions, and establishing a new plan of care. Today, my ACP, Arvin Dennis NP, was here to observe my evaluation and management services for this new problem/exacerbated condition to be followed going forward.\par \par  \par \par

## 2023-07-25 ENCOUNTER — APPOINTMENT (OUTPATIENT)
Dept: THORACIC SURGERY | Facility: CLINIC | Age: 68
End: 2023-07-25

## 2023-08-03 ENCOUNTER — APPOINTMENT (OUTPATIENT)
Dept: RADIOLOGY | Facility: HOSPITAL | Age: 68
End: 2023-08-03

## 2023-08-03 ENCOUNTER — OUTPATIENT (OUTPATIENT)
Dept: OUTPATIENT SERVICES | Facility: HOSPITAL | Age: 68
LOS: 1 days | End: 2023-08-03
Payer: COMMERCIAL

## 2023-08-03 DIAGNOSIS — K22.5 DIVERTICULUM OF ESOPHAGUS, ACQUIRED: ICD-10-CM

## 2023-08-03 DIAGNOSIS — Z90.49 ACQUIRED ABSENCE OF OTHER SPECIFIED PARTS OF DIGESTIVE TRACT: Chronic | ICD-10-CM

## 2023-08-03 DIAGNOSIS — Z90.5 ACQUIRED ABSENCE OF KIDNEY: Chronic | ICD-10-CM

## 2023-08-03 PROCEDURE — 74220 X-RAY XM ESOPHAGUS 1CNTRST: CPT | Mod: 26

## 2023-08-15 ENCOUNTER — APPOINTMENT (OUTPATIENT)
Dept: THORACIC SURGERY | Facility: CLINIC | Age: 68
End: 2023-08-15
Payer: COMMERCIAL

## 2023-08-15 VITALS
BODY MASS INDEX: 25.91 KG/M2 | DIASTOLIC BLOOD PRESSURE: 87 MMHG | HEIGHT: 60 IN | OXYGEN SATURATION: 96 % | HEART RATE: 80 BPM | WEIGHT: 132 LBS | SYSTOLIC BLOOD PRESSURE: 136 MMHG | RESPIRATION RATE: 16 BRPM

## 2023-08-15 PROCEDURE — 99213 OFFICE O/P EST LOW 20 MIN: CPT

## 2023-08-15 NOTE — PHYSICAL EXAM
[] : no respiratory distress [Respiration, Rhythm And Depth] : normal respiratory rhythm and effort [Exaggerated Use Of Accessory Muscles For Inspiration] : no accessory muscle use [Auscultation Breath Sounds / Voice Sounds] : lungs were clear to auscultation bilaterally [Examination Of The Chest] : the chest was normal in appearance [Chest Visual Inspection Thoracic Asymmetry] : no chest asymmetry [Diminished Respiratory Excursion] : normal chest expansion [2+] : left 2+ [Bowel Sounds] : normal bowel sounds [Abdomen Soft] : soft [Abdomen Tenderness] : non-tender [Involuntary Movements] : no involuntary movements were seen [Oriented To Time, Place, And Person] : oriented to person, place, and time

## 2023-08-16 NOTE — HISTORY OF PRESENT ILLNESS
[FreeTextEntry1] : Mr. TROY OBRIEN, 67 year old male, never a smoker, w/ hx of Appendectomy and nephrectomy (Kidney donation); Severe reflux, Zenker's diverticulum and large hiatal hernia Referred by Dr. Goldsmith for further evaluation and treatment.   Esophagram and GI series on 1/5/22:  - Zenker's diverticulum unchanged from the prior study measuring 6 mm - This fills and empties with associated vigorous cricopharyngeal contraction which relaxes rapidly - Sliding hiatal hernia  CT Chest on 1/31/22: - KNown Zenker's diverticulum is not visible on CT - Contrast within the esophagus which may be due to retention or reflux  Endoscopy and BRAVO on 3/9/22: LA grade B reflux esophagitis in setting of 4 cm HH. Few gastric polyps Path demonstrating chronic inactive gastritis; No Helicobacter microorganisms identified; No evidence of intestinal metaplasia. DeMeester score 41.7; Positive pH study OFF therapy.   Manometry on 3/16/22:  - Ineffective esophageal motility with adequate peristaltic reserve  Now, s/p Upper endoscopy, redo robotic-assisted lysis of adhesions, repair of hiatal  hernia, and Toupet fundoplication on 5/6/22. Path of hernia sac: Benign fibroconnective and adipose tissue consistent with hernia sac. Path of GE junction: Benign adipose tissue with congested blood vessels and reactive lymph nodes  Barium Esophagram on 6/23/22:  - Esophagus showed normal contour and motility - Contrast passed unimpeded through the postoperative gastroesophageal junction into a normal appearing stomach. - Barium tablet was swallowed and remained at the GE junction on delayed imaging.  Barium esophagram on 01/24/2023. s/p Hiatal hernia repair without leak.   Barium esophagram on 08/03/2023:  -  Cricopharyngeal bar. *  A Zenker's diverticulum was not identified. *  Status post hiatal hernia repair. A 13 mm tablet was not able to pass through the fundoplication wrap, similar to the prior exam.  Here today for follow up. Today, reports toleratng a regular diet. No dysphagia, reflux or regurgitation. Overall, feeling well; Weight stable. Today, patient denies worsening SOB, chest pain, cough, hemoptysis, fever, chills, night sweats, lightheadedness or dizziness.

## 2023-08-16 NOTE — CONSULT LETTER
[FreeTextEntry3] : Rito Hester MD, FACS \par Chief, Division of Thoracic Surgery \par Director, Minimally Invasive Thoracic Surgery \par Department of Cardiovascular and Thoracic Surgery \par [facility] \par , Cardiovascular and Thoracic Surgery\par \par \par  [FreeTextEntry2] : Dr. Agus Delgado () \par

## 2023-08-16 NOTE — DATA REVIEWED
[FreeTextEntry1] : Independently reviewed the following: - Barium Esophagram on 6/23/22 - Barium Esophagram ON 08/03/2023

## 2023-08-16 NOTE — ASSESSMENT
[FreeTextEntry1] : Mr. TROY OBRIEN, 67 year old male, never a smoker, w/ hx of Appendectomy and nephrectomy (Kidney donation); Severe reflux, Zenker's diverticulum and large hiatal hernia Referred by Dr. Goldsmith for further evaluation and treatment.  Now, s/p Upper endoscopy, redo robotic-assisted lysis of adhesions, repair of hiatal  hernia, and Toupet fundoplication on 5/6/22. Path of hernia sac: Benign fibroconnective and adipose tissue consistent with hernia sac. Path of GE junction: Benign adipose tissue with congested blood vessels and reactive lymph nodes  Here today w/ Barium Esophagram for follow up.   I have independently reviewed the medical records and imaging at the time of this office consultation, and discussed the following interpretations with the patient: - Esophagram reviewed. Stable findings. Discussed returning to clinic in 6 months with repeat Barium esophagram to re-evaluate post op stability. He is agreeable.   Recommendations reviewed with patient during this office visit, and all questions answered; Patient instructed on the importance of follow up and verbalizes understanding.  I, Dr. CRAWFORD [facility], personally performed the evaluation and management (E/M) services for this established patient. That E/M includes conducting the examination, assessing all new/exacerbated conditions, and establishing a new plan of care. Today, My ACP, Lisa Benjamin, was here to observe my evaluation and management services for this patient to be followed going forward.

## 2024-02-14 NOTE — ED ADULT NURSE NOTE - NSFALLRSKASSESSDT_ED_ALL_ED
22-Sep-2018 14:06 Post Acute Skilled Nursing Home Subsequent Visit Note     Date of Service: 2/13/2024  Location seen at: [facility]  Subacute / Skilled Need: Rehabilitation    PCP: Fauzia Bowles MD   Patient Care Team:  Fauzia Bowles MD as PCP - General (Internal Medicine)  Nya Cartagena, [address] [facility] as Dietitian (Dietitian, Registered)  Faraz Arroyo CNP as Nurse Practitioner (Cardiology)  Elayne Cummings DO (Cardiovascular Disease)  Maria G Matt MD as [address] [place] [address] Provider: Physician [facility])  Angelica Neff CNP as [address] [place] [address] Provider: Beatrice Auguste (Nurse Practitioner - Family)  Seen today by Brsieyda Coto CNP      Marcelina Goldmann is a 69year old female presenting to [address] [place] [address] for: rehab. History of Present Illness:  71-year-old female who was admitted for acute on chronic hypoxic respiratory failure secondary to COVID-19 infection and COPD exacerbation. Patient was initially monitored closely in the ICU due to requiring high amounts of oxygen. Patient was started on IV steroids which she currently has completed. Patient's oxygen baseline is 5 to 6 L however currently she is requiring up to 10 L during ambulation. Pulmonary has been consulted. CT chest without contrast shows severe four-chamber cardiomegaly with mild congestive heart failure, dilated pulmonary trunk suggest pulmonary hypertension. Patient was continued on breathing treatments and incentive spirometry, chest physio and inhalers breathing has improved. PT OT work with the patient and recommended SNF placement. Per case management and  patient does not need a home O2 eval since patient is going to SNF. Patient will need oxygen at discharge. Case management social worker assisting with safe discharge to SNF. Discussed plan of care with patient and patient's son Kala Vasquez in detail.      During the hospital stay patient developed bright red blood per rectum which was likely due to hemorrhoids. Initially Eliquis was held but since bleeding stopped and hemoglobin stable Eliquis was restarted. There is no more GI bleed. GI was consulted and recommended conservative treatment such as increased fiber intake sitz bath's and topical analgesics. Patient will need to follow-up with GI outpatient in 3 to 4 weeks after discharge for possible colonoscopy. Cardiology consulted for possible heart failure exacerbation. Cardiology recommended to continue diuretics and adjusted paints medications. Pt advised to follow-up outpatient with cardiology. Patient also found to have an enlarged thyroid and is recommended nonemergent thyroid ultrasound outpatient. Patient also has moderate hiatal hernia as seen on CT and advised to follow-up with PCP. Acute hypoxic respiratory failure secondary to COVID-19 infection and COPD exacerbation  Acute COPD exacerbation, on 5 to 6 L nasal cannula baseline  -Patient has been downgraded from the ICU  -s/p treatm steroid treatment   -Currently on nasal cannula 6-8L , BiPAP at night  -Maintain SpO2 between 88 and 94%  -Continue DuBrett Schillingta  -Continue chest physio, spirometry, PT OT  -Encourage up in the chair  -Strict I's and O's  -Pulmonary and cardiology following.    -Dispo: Patient has persistent shortness of breath requiring high amounts of oxygen. Pulmonary has consulted cardiology for possible new onset of heart failure and we are awaiting their recommendations. Patient will need home O2 eval prior to DC. Awaiting SNF versus home with home health care. Discussed with case management.       Bright red blood per rectum  History of hemorrhoids  -Resolved monitor hemoglobin    -GI following, appreciate recs  -Continue Eliquis  -Monitor H&H maintain active type and screen hemoglobin above 7  -Continue conservative treatment such as increased fiber intake sitz bath and topical analgesics  -Patient need outpatient follow-up in 3 to 4 weeks after hospital discharge for possible colonoscopy  -Continue to monitor     Essential hypertension, EF 60 to 65% on echo 1/9/2024   History of paroxysmal atrial fibrillation currently normal sinus rhythm with intermittent bouts of A-fib with RVR   Suspected new onset of diastolic heart failure  -Continue Cardizem, metoprolol, Lasix, Eliquis   -Continue telemetry  -Cardiology consulted, appreciate recs  -Echocardiogram 1/8/2024 showed EF of 66 5% and unable to assess diastolic dysfunction. Echo was a poor study     Chronic constipation   Hemorrhoidal bleed, resolved  -Continue senna, MiraLAX      Iron deficiency anemia obesity hemoglobin 10   -No bleeding noted   -Monitor hemoglobin      Hyperthyroidism   -Continue methimazole 5 mg daily   -TSH within normal limits      Monitoring for Steroid-Induced Hyperglycemia   - Continue LDSS  - Accuchecks ACHS     Gout  -Continue allopurinol     DVT Prophy: Eliquis      Above reviewed and copied from inpatient notes. Pt seen sitting up in w/c. Pt on 10L NC with O2 sats 95%. States she requires 10L with moving/exertion but 8L at rest. Some LUIS but then subsides with rest. Denies pain. No n/v/c/d. Had BM just prior to me entering room. No dysuria. Above HPI per previous provider:  2/13 Pt seen today sitting up in w/c, afebrile, NAD. She is presently on 8L of 02, decreased her to 6L, tolerating well. Education demo and redemo done w/ pursed lip breathing, did well. She does have some LUIS w/ minimal movement. Overall she reports feelings better. She is requesting a bariatric w/c and commode, endorsed to nursing. She  denies N/V/D, abd. Pain, CP, palpitations, HA, dizziness at this time. Tolerating PO well, no constipation at this time and denies any urinary symptoms. No other verbalized concerns or complaints by patient or nursing staff.       HISTORY  Past Medical History:   Diagnosis Date    Allergy     Anemia     Atrial fibrillation (CMD)     Congestive heart failure (CHF) (CMD) unknown    Essential (primary) hypertension     GERD (gastroesophageal reflux disease)     Thyroid disease       reports that she has never smoked. She has never used smokeless tobacco. She reports that she does not currently use alcohol. She reports that she does not use drugs. Past Surgical History:   Procedure Laterality Date    Ablation - atrial flutter      Hysterectomy      Small intestine surgery       Family History   Problem Relation Age of Onset    Hypertension Mother     Cancer, Lung Father     Diabetes Paternal Grandmother      History       Not marked as reviewed during this visit.             PROBLEM LIST:  Patient Active Problem List   Diagnosis    Hypoventilation associated with obesity (CMD)    Pulmonary hypertension (CMD)    Hyperthyroidism    Chronic obstructive pulmonary disease (CMD)    Benign hypertensive heart and kidney disease with CHF and stage 2 chronic kidney disease (CMD)    Morbid obesity with BMI of 50.0-59.9, adult (CMD)    Atrial fibrillation (CMD)    Deep vein thrombosis (DVT) of femoral vein of right lower extremity (CMD)    Chronic heart failure with preserved ejection fraction (CMD)    Need for hepatitis C screening test    Chronic low back pain    Anemia    Sleep apnea    Polyp of labia and vulva    Chronic respiratory failure with hypoxia (CMD)    Edema of both legs    Urinary incontinence    Stage 2 chronic kidney disease    Medicare annual wellness visit, subsequent    Benign hypertensive heart disease    Elevated alkaline phosphatase level    Tinea cruris    Chronic allergic rhinitis    Epistaxis    Weight loss    Sensorineural hearing loss (SNHL) of both ears    Acute hypoxemic respiratory failure due to COVID-19 (CMD)    Hemorrhoids    Gout    Steroid-induced hyperglycemia    Frailty     ALLERGIES:  Allergies as of 02/13/2024 - Reviewed 08/11/2023   Allergen Reaction Noted    Eggs or egg-derived products   (food or med) Other (See Comments) 07/28/2015 Lisinopril Other (See Comments) 12/02/2020    No name available Other (See Comments) 02/26/2010    Cucumber   (food or med) PRURITUS 06/06/2014       CURRENT MEDICATIONS:   Medications reviewed in SNF facility EMR    BASELINE FUNCTIONAL STATUS:  Ines Cisneros    CURRENT FUNCTIONAL STATUS:  Walker and Wheelchair    DIET:  Consistency: General   Type: regular, cardiac diet  Appetite: Normal    REVIEW OF SYSTEMS:  Review of Systems   Constitutional:  Positive for activity change. Negative for chills and fever. HENT: Negative. Respiratory:  Positive for shortness of breath (with exertion). Negative for cough. Cardiovascular:  Positive for leg swelling. Gastrointestinal: Negative. Genitourinary: Negative. Musculoskeletal: Negative. Skin: Negative. Neurological: Negative. Psychiatric/Behavioral: Negative. VITALS:  Visit Vitals  /78   Pulse 78   Temp 98.7 Â°F (37.1 Â°C)   Resp 18   SpO2 97%       PAIN SCORE:  Vitals:    02/13/24 1536   PainSc:  0       PHYSICAL ASSESSMENT:  Physical Exam  Vitals and nursing note reviewed. Constitutional:       General: She is not in acute distress. Appearance: She is obese. She is not ill-appearing. HENT:      Head: Normocephalic. Nose: Nose normal.      Mouth/Throat:      Mouth: Mucous membranes are moist.   Eyes:      Conjunctiva/sclera: Conjunctivae normal.   Cardiovascular:      Rate and Rhythm: Normal rate and regular rhythm. Pulses: Normal pulses. Heart sounds: Normal heart sounds. Pulmonary:      Effort: Pulmonary effort is normal. No respiratory distress. Breath sounds: Normal breath sounds. No wheezing or rhonchi. Comments: Diminished breath sounds  Abdominal:      General: Bowel sounds are normal. There is no distension. Palpations: Abdomen is soft. Musculoskeletal:         General: Normal range of motion. Right lower leg: Edema present. Left lower leg: Edema present.    Skin:     General: Skin is warm. Neurological:      Mental Status: She is alert and oriented to person, place, and time. Psychiatric:         Mood and Affect: Mood normal.         Behavior: Behavior normal.         LABS:  2/6  Na 142 K 4.2 Bun 12 Cr 0.9  Wbc 4.6 Hgb 9.5 Plt 301    ASSESSMENT AND PLAN    #Chronic heart failure with preserved ejection fraction (CMD)  EF 60-65% echo 1/9/24  New onset diastolic heart failure  Cardizem, metoprolol, furosemide   Monitor  F/u cards    #Chronic obstructive pulmonary disease with acute lower respiratory infection (CMD) 2/2 Covid 19 and COPD exaccerbation  Now on 8-10L NC - decreased to 6L on assessment  On home O2 5-6LNC  Duonebs and Breo  RRT to follow  Demo/redemo done with pursed lip breathing    #Acute hypoxemic respiratory failure due to COVID-19 (CMD)  Improved  Bipap at night  Now on 6-10L NC  On home O2 5-6LNC  Duonebs and Breo    #Hemorrhoids, unspecified hemorrhoid type  Monitor  Causing GI bleed inpatient; now resolved  Stools soft    #Benign hypertensive heart and kidney disease with CHF and stage 2 chronic kidney disease (CMD)    #Paroxysmal atrial fibrillation (CMD)  Eliquis    #Pulmonary hypertension (CMD)  Pulm following in house  O2 8-10L per NC  Duonebs and Breo    #Iron deficiency anemia, unspecified iron deficiency anemia type  Ferrous sulfate  Monitor  Hgb 9.5 prior to dc from hospital    #Edema of both legs  Negative DVT BLE   Reviewed venous duplex 1/2/24  Elevate legs    #Steroid-induced hyperglycemia  Monitor accu cheks- 130 this afternoon  Not on meds    #Hyperthyroidism  Methimazole  1/22/24 TSH 1.537 T3 3.19 T4 1.26    #gout  Cont allopurinol    #Frailty  #debility  PT/OT per rehab  safety fall precautions  supportive care and education provided    Code: FULL      FOLLOW UP APPOINTMENTS:  No future appointments. Mary Sanabria MD Follow up in 1 week(s). Specialty: Cardiology    Miller Counts A, DO.  Schedule an appointment as soon as possible for a visit in 2 week(s). Specialties: Gastroenterology, Internal Medicine  Why: to discuss an outpatient colonoscopy    Favian Clement MD Follow up in 1 week(s). Specialties: Pulmonary Disease, Critical Care Medicine    Jd Barrett MD. Call in 1 week(s). Specialty: Internal Medicine        DISCHARGE PLANNING: home with son    Prognosis: fair    Discussed with: RN / Nursing, Patient, and Reviewed old records    Total time spent 35 min which includes chart review, initial and re-evaluations, ordering and interpreting diagnostics, ordering/providing medical treatment and assessing patient response, discussion with team members,consultants, and/or other specialist services, and discussion with patient/family regarding results and further plan of care. Greater than 50% of the face-to-facetime was spent counseling the patient and/or proxy.     Estevan Santana CNP  Electronically signed 2/13/2024

## 2024-04-02 ENCOUNTER — APPOINTMENT (OUTPATIENT)
Dept: THORACIC SURGERY | Facility: CLINIC | Age: 69
End: 2024-04-02
Payer: COMMERCIAL

## 2024-04-02 ENCOUNTER — OUTPATIENT (OUTPATIENT)
Dept: OUTPATIENT SERVICES | Facility: HOSPITAL | Age: 69
LOS: 1 days | End: 2024-04-02

## 2024-04-02 ENCOUNTER — APPOINTMENT (OUTPATIENT)
Dept: RADIOLOGY | Facility: HOSPITAL | Age: 69
End: 2024-04-02
Payer: COMMERCIAL

## 2024-04-02 VITALS
DIASTOLIC BLOOD PRESSURE: 79 MMHG | BODY MASS INDEX: 24.54 KG/M2 | SYSTOLIC BLOOD PRESSURE: 156 MMHG | WEIGHT: 125 LBS | HEIGHT: 60 IN | HEART RATE: 73 BPM | RESPIRATION RATE: 16 BRPM | OXYGEN SATURATION: 96 %

## 2024-04-02 DIAGNOSIS — K22.5 DIVERTICULUM OF ESOPHAGUS, ACQUIRED: ICD-10-CM

## 2024-04-02 DIAGNOSIS — K44.9 DIAPHRAGMATIC HERNIA W/OUT OBSTRUCTION OR GANGRENE: ICD-10-CM

## 2024-04-02 DIAGNOSIS — Z98.89 OTHER SPECIFIED POSTPROCEDURAL STATES: Chronic | ICD-10-CM

## 2024-04-02 DIAGNOSIS — K44.9 DIAPHRAGMATIC HERNIA WITHOUT OBSTRUCTION OR GANGRENE: ICD-10-CM

## 2024-04-02 DIAGNOSIS — Z90.5 ACQUIRED ABSENCE OF KIDNEY: Chronic | ICD-10-CM

## 2024-04-02 DIAGNOSIS — Z90.49 ACQUIRED ABSENCE OF OTHER SPECIFIED PARTS OF DIGESTIVE TRACT: Chronic | ICD-10-CM

## 2024-04-02 PROCEDURE — 74220 X-RAY XM ESOPHAGUS 1CNTRST: CPT | Mod: 26

## 2024-04-02 PROCEDURE — 99213 OFFICE O/P EST LOW 20 MIN: CPT

## 2024-04-02 NOTE — DATA REVIEWED
[FreeTextEntry1] : I have independently reviewed the following: xray esophagram today Normal rate, regular rhythm.  Heart sounds S1, S2.  No murmurs, rubs or gallops.

## 2024-04-02 NOTE — CONSULT LETTER
[Dear  ___] : Dear  [unfilled], [Courtesy Letter:] : I had the pleasure of seeing your patient, [unfilled], in my office today. [Please see my note below.] : Please see my note below. [Sincerely,] : Sincerely, [FreeTextEntry3] : Rito Hester MD, FACS \par  Chief, Division of Thoracic Surgery \par  Director, Minimally Invasive Thoracic Surgery \par  Department of Cardiovascular and Thoracic Surgery \par  [facility] \par  , Cardiovascular and Thoracic Surgery\par  \par  \par   [FreeTextEntry2] : Dr. Agus Delgado () \par

## 2024-04-02 NOTE — HISTORY OF PRESENT ILLNESS
[FreeTextEntry1] : Mr. TROY OBRIEN, 68 year old male, never a smoker, w/ hx of Appendectomy and nephrectomy (Kidney donation); Severe reflux, Zenker's diverticulum and large hiatal hernia Referred by Dr. Goldsmith for further evaluation and treatment.   Esophagram and GI series on 1/5/22:  - Zenker's diverticulum unchanged from the prior study measuring 6 mm - This fills and empties with associated vigorous cricopharyngeal contraction which relaxes rapidly - Sliding hiatal hernia  CT Chest on 1/31/22: - Known Zenker's diverticulum is not visible on CT - Contrast within the esophagus which may be due to retention or reflux  Endoscopy and BRAVO on 3/9/22: LA grade B reflux esophagitis in setting of 4 cm HH. Few gastric polyps Path demonstrating chronic inactive gastritis; No Helicobacter microorganisms identified; No evidence of intestinal metaplasia. DeMeester score 41.7; Positive pH study OFF therapy.   Manometry on 3/16/22:  - Ineffective esophageal motility with adequate peristaltic reserve  Now, s/p Upper endoscopy, redo robotic-assisted lysis of adhesions, repair of hiatal  hernia, and Toupet fundoplication on 5/6/22. Path of hernia sac: Benign fibroconnective and adipose tissue consistent with hernia sac.Path of GE junction: Benign adipose tissue with congested blood vessels and reactive lymph nodes  Barium Esophagram on 6/23/22:  - Esophagus showed normal contour and motility - Contrast passed unimpeded through the postoperative gastroesophageal junction into a normal appearing stomach. - Barium tablet was swallowed and remained at the GE junction on delayed imaging.  Barium esophagram on 01/24/2023. s/p Hiatal hernia repair without leak.   Barium esophagram on 08/03/2023:  -  Cricopharyngeal bar. *  A Zenker's diverticulum was not identified. *  Status post hiatal hernia repair. A 13 mm tablet was not able to pass through the fundoplication wrap, similar to the prior exam.  Barium Esophagram today-- reviewed  Here today for follow up. Overall, he reports to be feeling well. Denies any chest pain, shortness of breath, cough, acid reflux, regurgitation, or difficulty swallowing.

## 2024-04-02 NOTE — PHYSICAL EXAM
[] : no respiratory distress [Auscultation Breath Sounds / Voice Sounds] : lungs were clear to auscultation bilaterally [Heart Rate And Rhythm] : heart rate was normal and rhythm regular [Heart Sounds] : normal S1 and S2 [Heart Sounds Gallop] : no gallops [Murmurs] : no murmurs [Heart Sounds Pericardial Friction Rub] : no pericardial rub [Diminished Respiratory Excursion] : normal chest expansion [Examination Of The Chest] : the chest was normal in appearance [Chest Visual Inspection Thoracic Asymmetry] : no chest asymmetry

## 2024-04-02 NOTE — ASSESSMENT
[FreeTextEntry1] : Mr. TROY OBRIEN, 68 year old male, never a smoker, w/ hx of Appendectomy and nephrectomy (Kidney donation); Severe reflux, Zenker's diverticulum and large hiatal hernia Referred by Dr. Goldsmith for further evaluation and treatment.  Now, s/p Upper endoscopy, redo robotic-assisted lysis of adhesions, repair of hiatal  hernia, and Toupet fundoplication on 5/6/22. Path of hernia sac: Benign fibroconnective and adipose tissue consistent with hernia sac. Path of GE junction: Benign adipose tissue with congested blood vessels and reactive lymph nodes  Here today w/ Barium Esophagram for follow up.   I have independently reviewed the medical records and imaging at the time of this office consultation, and discussed the following interpretations with the patient: - Xray esophagram reviewed with patient, stable findings. I discussed he returns to clinic in 6 months with repeat xray esophagram. - Continue follow up with GI.   Recommendations reviewed with patient during this office visit, and all questions answered; Patient instructed on the importance of follow up and verbalizes understanding.   I, ANNA RothmanIM, personally performed the evaluation and management (E/M) services for this established patient. That E/M includes conducting the examination, assessing all new/exacerbated conditions, and establishing a new plan of care. Today, my ACP, Arvin Dennis NP, was here to observe my evaluation and management services for this new problem/exacerbated condition to be followed going forward.

## 2024-04-05 ENCOUNTER — NON-APPOINTMENT (OUTPATIENT)
Age: 69
End: 2024-04-05

## 2024-04-06 ENCOUNTER — EMERGENCY (EMERGENCY)
Facility: HOSPITAL | Age: 69
LOS: 1 days | Discharge: HOME | End: 2024-04-06
Attending: EMERGENCY MEDICINE
Payer: COMMERCIAL

## 2024-04-06 VITALS
SYSTOLIC BLOOD PRESSURE: 137 MMHG | TEMPERATURE: 99 F | DIASTOLIC BLOOD PRESSURE: 78 MMHG | WEIGHT: 125 LBS | OXYGEN SATURATION: 99 % | HEART RATE: 83 BPM | RESPIRATION RATE: 17 BRPM | HEIGHT: 60 IN

## 2024-04-06 DIAGNOSIS — Z90.49 ACQUIRED ABSENCE OF OTHER SPECIFIED PARTS OF DIGESTIVE TRACT: Chronic | ICD-10-CM

## 2024-04-06 DIAGNOSIS — Z98.89 OTHER SPECIFIED POSTPROCEDURAL STATES: Chronic | ICD-10-CM

## 2024-04-06 DIAGNOSIS — Z90.5 ACQUIRED ABSENCE OF KIDNEY: Chronic | ICD-10-CM

## 2024-04-06 PROCEDURE — 99284 EMERGENCY DEPT VISIT MOD MDM: CPT | Mod: 25

## 2024-04-06 PROCEDURE — 93971 EXTREMITY STUDY: CPT | Mod: 26,RT

## 2024-04-06 PROCEDURE — 99284 EMERGENCY DEPT VISIT MOD MDM: CPT

## 2024-04-06 PROCEDURE — 93971 EXTREMITY STUDY: CPT

## 2024-04-06 NOTE — ED PROVIDER NOTE - OBJECTIVE STATEMENT
68-year-old male with a past medical history of BPH, GERD, left kidney nephrectomy (donated), hiatal hernia repair, appendectomy, cholecystectomy presents with swelling, tenderness to palpation and bruising to the right forearm for 3 days.  Patient reports that is getting bigger and bigger.  Denies any injury.  Patient reports he went to urgent care who referred him to the emergency room.

## 2024-04-06 NOTE — ED PROVIDER NOTE - NSFOLLOWUPINSTRUCTIONS_ED_ALL_ED_FT
Follow up with your primary care doctor within 1 week.     If the bruising extends outside of the marking return to the emergency room.     You can take motrin/tylenol as needed for pain.    If you experience any new or worsening symptoms or if you are concerned you can always come back to the emergency for a re-evaluation.

## 2024-04-06 NOTE — ED ADULT NURSE NOTE - NSFALLUNIVINTERV_ED_ALL_ED
Bed/Stretcher in lowest position, wheels locked, appropriate side rails in place/Call bell, personal items and telephone in reach/Instruct patient to call for assistance before getting out of bed/chair/stretcher/Non-slip footwear applied when patient is off stretcher/[place] to call system/Physically safe environment - no spills, clutter or unnecessary equipment/Purposeful proactive rounding/Room/bathroom lighting operational, light cord in reach

## 2024-04-06 NOTE — ED ADULT NURSE NOTE - FINAL NURSING ELECTRONIC SIGNATURE
COVID-19 Screening:    • Does the patient OR patient’s household members have any of the following symptoms?  o Temperature: Fever ?100.0°F or ?37.8°C?  No    o Respiratory symptoms: New or worsening cough, shortness of breath, difficulty breathing, or sore throat? Yes, for patient: new or worsening cough and sore throat.    o GI symptoms: New onset of nausea, vomiting or diarrhea?  No    o Miscellaneous: New onset of loss of taste or smell, chills, repeated shaking with chills, muscle pain, headache, congestion or runny nose?  Yes, for patient: headache, congestion and runny nose.      Has the patient or a household member been tested for COVID-19 in the past 14 days?  No  (if yes, include result)    Have you had known exposure to COVID-19 (contact with someone with known/suspected COVID-19?) in the past 14 days? No      Patient presents to the urgent care with a complaint of having a \"cold\" and needing a covid test for work. Symtpoms for 3 days  OTC-robitussin  PCP-no  Work note-yes  Visit Vitals  BP (!) 131/93   Pulse 84   Temp 97.2 °F (36.2 °C) (Tympanic)   Resp 18   Wt 76.5 kg   SpO2 96%   BMI 24.20 kg/m²                       06-Apr-2024 18:25

## 2024-04-06 NOTE — ED PROVIDER NOTE - PHYSICAL EXAMINATION
Right forearm - [id number] area of ecchymosis with central clearing. +TTP at center. +swelling. Full ROM of elbow and wrist.     Right hand - additional area of ecchymosis noted to right posterior hand. [id number]

## 2024-04-06 NOTE — ED ADULT TRIAGE NOTE - CHIEF COMPLAINT QUOTE
Pt c/o itching, swelling to Rt lower forearm x 3 days with possible insects bite. Pt noted ecchymosis to Rt lower forearm.

## 2024-04-06 NOTE — ED PROVIDER NOTE - PATIENT PORTAL LINK FT
You can access the FollowMyHealth Patient Portal offered by [facility] by registering at the following website: [email or web address]. By joining [employer]’s FollowMyHealth portal, you will also be able to view your health information using other applications (apps) compatible with our system.

## 2024-04-06 NOTE — ED PROVIDER NOTE - PROGRESS NOTE DETAILS
US negative. Outlined the ecchymosis and informed the patient to return if it extends outside of that. Will dc home with PCP follow up. Pt is well appearing walking with steady gait, stable for discharge and follow up without fail with medical doctor. I had a detailed discussion with the patient and/or guardian regarding the historical points, exam findings, and any diagnostic results supporting the discharge diagnosis. Pt educated on care and need for follow up. Strict return instructions and red flag signs and symptoms discussed with patient. Questions answered. Pt shows understanding of discharge information and agrees to follow.

## 2024-04-06 NOTE — ED PROVIDER NOTE - ATTENDING APP SHARED VISIT CONTRIBUTION OF CARE
Seen with ACP patient complaining of swelling and ecchymosis to left forearm patient has ecchymosis over a large area of the left forearm with central clearing and question fluctuance will get ultrasound to determine if there is a collection under the skin and fascia if negative will discharge agree with ACPs assessment history and physical and disposition

## 2024-04-06 NOTE — ED PROVIDER NOTE - CLINICAL SUMMARY MEDICAL DECISION MAKING FREE TEXT BOX
68-year-old male with a past medical history of BPH, GERD, left kidney nephrectomy (donated), hiatal hernia repair, appendectomy, cholecystectomy presents with swelling, tenderness to palpation and bruising to the right forearm for 3 days.  Patient reports that is getting bigger and bigger.  Denies any injury.  Patient reports he went to urgent care who referred him to the emergency room.    Right forearm - [id number] area of ecchymosis with central clearing. +TTP at center. +swelling. Full ROM of elbow and wrist.     Will obtain US to r/o DVT

## 2024-09-27 NOTE — HISTORY OF PRESENT ILLNESS
[FreeTextEntry1] : Mr. TROY OBRIEN, 68 year old male, never a smoker, w/ hx of Appendectomy and nephrectomy (Kidney donation); Severe reflux, Zenker's diverticulum and large hiatal hernia Referred by Dr. Goldsmith for further evaluation and treatment.   Esophagram and GI series on 1/5/22:  - Zenker's diverticulum unchanged from the prior study measuring 6 mm - This fills and empties with associated vigorous cricopharyngeal contraction which relaxes rapidly - Sliding hiatal hernia  CT Chest on 1/31/22: - KNown Zenker's diverticulum is not visible on CT - Contrast within the esophagus which may be due to retention or reflux  Endoscopy and BRAVO on 3/9/22: LA grade B reflux esophagitis in setting of 4 cm HH. Few gastric polyps Path demonstrating chronic inactive gastritis; No Helicobacter microorganisms identified; No evidence of intestinal metaplasia. DeMeester score 41.7; Positive pH study OFF therapy.   Manometry on 3/16/22:  - Ineffective esophageal motility with adequate peristaltic reserve  Now, s/p Upper endoscopy, redo robotic-assisted lysis of adhesions, repair of hiatal  hernia, and Toupet fundoplication on 5/6/22. Path of hernia sac: Benign fibroconnective and adipose tissue consistent with hernia sac. Path of GE junction: Benign adipose tissue with congested blood vessels and reactive lymph nodes  Barium Esophagram on 6/23/22:  - Esophagus showed normal contour and motility - Contrast passed unimpeded through the postoperative gastroesophageal junction into a normal appearing stomach. - Barium tablet was swallowed and remained at the GE junction on delayed imaging.  Barium esophagram on 01/24/2023. s/p Hiatal hernia repair without leak.   Barium esophagram on 08/03/2023:  -  Cricopharyngeal bar. *  A Zenker's diverticulum was not identified. *  Status post hiatal hernia repair. A 13 mm tablet was not able to pass through the fundoplication wrap, similar to the prior exam.  Barium Esophagram on ....  Here today for follow up. 
decreased strength

## 2024-09-27 NOTE — ASSESSMENT
[FreeTextEntry1] : Mr. TROY OBRIEN, 68 year old male, never a smoker, w/ hx of Appendectomy and nephrectomy (Kidney donation); Severe reflux, Zenker's diverticulum and large hiatal hernia Referred by Dr. Goldsmith for further evaluation and treatment.  Now, s/p Upper endoscopy, redo robotic-assisted lysis of adhesions, repair of hiatal  hernia, and Toupet fundoplication on 5/6/22. Path of hernia sac: Benign fibroconnective and adipose tissue consistent with hernia sac. Path of GE junction: Benign adipose tissue with congested blood vessels and reactive lymph nodes  Here today w/ Barium Esophagram for follow up.   I have independently reviewed the medical records and imaging at the time of this office consultation, and discussed the following interpretations with the patient: -  Recommendations reviewed with patient during this office visit, and all questions answered; Patient instructed on the importance of follow up and verbalizes understanding.

## 2024-09-27 NOTE — CONSULT LETTER
[Dear  ___] : Dear  [unfilled], [Courtesy Letter:] : I had the pleasure of seeing your patient, [unfilled], in my office today. [Please see my note below.] : Please see my note below. [Sincerely,] : Sincerely, [FreeTextEntry2] : Dr. Agus Delgado () \par   [FreeTextEntry3] : Rito Hester MD, FACS \par  Chief, Division of Thoracic Surgery \par  Director, Minimally Invasive Thoracic Surgery \par  Department of Cardiovascular and Thoracic Surgery \par  [facility] \par  , Cardiovascular and Thoracic Surgery\par  \par  \par

## 2024-10-01 ENCOUNTER — APPOINTMENT (OUTPATIENT)
Dept: RADIOLOGY | Facility: HOSPITAL | Age: 69
End: 2024-10-01

## 2024-10-01 ENCOUNTER — APPOINTMENT (OUTPATIENT)
Dept: THORACIC SURGERY | Facility: CLINIC | Age: 69
End: 2024-10-01

## 2024-10-01 DIAGNOSIS — K44.9 DIAPHRAGMATIC HERNIA W/OUT OBSTRUCTION OR GANGRENE: ICD-10-CM

## 2025-05-07 ENCOUNTER — APPOINTMENT (OUTPATIENT)
Dept: RADIOLOGY | Facility: HOSPITAL | Age: 70
End: 2025-05-07

## 2025-05-12 ENCOUNTER — NON-APPOINTMENT (OUTPATIENT)
Age: 70
End: 2025-05-12

## 2025-05-14 ENCOUNTER — APPOINTMENT (OUTPATIENT)
Dept: THORACIC SURGERY | Facility: CLINIC | Age: 70
End: 2025-05-14

## 2025-05-14 DIAGNOSIS — K44.9 DIAPHRAGMATIC HERNIA W/OUT OBSTRUCTION OR GANGRENE: ICD-10-CM

## 2025-05-14 NOTE — HISTORY OF PRESENT ILLNESS
[FreeTextEntry1] : Mr. LINA LINARES, 68 year old male, never a smoker, w/ hx of Appendectomy and nephrectomy (Kidney donation); Severe reflux, Zenker's diverticulum and large hiatal hernia Referred by Dr. Bates for further evaluation and treatment.   Esophagram and GI series on 1/5/22:  - Zenker's diverticulum unchanged from the prior study measuring 6 mm - This fills and empties with associated vigorous cricopharyngeal contraction which relaxes rapidly - Sliding hiatal hernia  CT Chest on 1/31/22: - KNown Zenker's diverticulum is not visible on CT - Contrast within the esophagus which may be due to retention or reflux  Endoscopy and BRAVO on 3/9/22: LA grade B reflux esophagitis in setting of 4 cm HH. Few gastric polyps Path demonstrating chronic inactive gastritis; No Helicobacter microorganisms identified; No evidence of intestinal metaplasia. DeMeester score 41.7; Positive pH study OFF therapy.   Manometry on 3/16/22:  - Ineffective esophageal motility with adequate peristaltic reserve  Now, s/p Upper endoscopy, redo robotic-assisted lysis of adhesions, repair of hiatal  hernia, and Toupet fundoplication on 5/6/22. Path of hernia sac: Benign fibroconnective and adipose tissue consistent with hernia sac. Path of GE junction: Benign adipose tissue with congested blood vessels and reactive lymph nodes  Barium Esophagram on 6/23/22:  - Esophagus showed normal contour and motility - Contrast passed unimpeded through the postoperative gastroesophageal junction into a normal appearing stomach. - Barium tablet was swallowed and remained at the GE junction on delayed imaging.  Barium esophagram on 01/24/2023. s/p Hiatal hernia repair without leak.   Barium esophagram on 08/03/2023:  -  Cricopharyngeal bar. *  A Zenker's diverticulum was not identified. *  Status post hiatal hernia repair. A 13 mm tablet was not able to pass through the fundoplication wrap, similar to the prior exam.  Last seen in April, 2024 w/ recommendation to return to clinic in 6 months with repeat Esophagram   Barium Esophagram on.....  Here today for follow up. Patient is overall feeling well. Tolerating a regular diet with no dysphagia, regurgitation or vomiting. Today, patient denies worsening SOB, chest pain, hemoptysis, fever, chills, night sweats, lightheadedness or dizziness.

## 2025-05-14 NOTE — CONSULT LETTER
[FreeTextEntry2] : Dr. Paulo Ruggiero () \par   [FreeTextEntry3] : Hermilo Chandler MD, FACS \par  Chief, Division of Thoracic Surgery \par  Director, Minimally Invasive Thoracic Surgery \par  Department of Cardiovascular and Thoracic Surgery \par  [facility] \par  , Cardiovascular and Thoracic Surgery\par  \par  \par

## 2025-05-14 NOTE — ASSESSMENT
[FreeTextEntry1] : Mr. LINA LINARES, 68 year old male, never a smoker, w/ hx of Appendectomy and nephrectomy (Kidney donation); Severe reflux, Zenker's diverticulum and large hiatal hernia Referred by Dr. Bates for further evaluation and treatment.  Now, s/p Upper endoscopy, redo robotic-assisted lysis of adhesions, repair of hiatal  hernia, and Toupet fundoplication on 5/6/22. Path of hernia sac: Benign fibroconnective and adipose tissue consistent with hernia sac. Path of GE junction: Benign adipose tissue with congested blood vessels and reactive lymph nodes  Here today w/ Barium Esophagram for follow up.   I have independently reviewed the medical records and imaging at the time of this office consultation, and discussed the following interpretations with the patient: -   Recommendations reviewed with patient during this office visit, and all questions answered; Patient instructed on the importance of follow up and verbalizes understanding.

## 2025-05-14 NOTE — CONSULT LETTER
[FreeTextEntry2] : Dr. Paulo Ruggiero () \par   [FreeTextEntry3] : Hermilo Chandler MD, FACS \par  Chief, Division of Thoracic Surgery \par  Director, Minimally Invasive Thoracic Surgery \par  Department of Cardiovascular and Thoracic Surgery \par  [facility] \par  , Cardiovascular and Thoracic Surgery\par  \par  \par

## (undated) DEVICE — XI ARM FORCEP CADIERE 8MM

## (undated) DEVICE — XI ARM GRASPER BIPOLAR LONG 8MM

## (undated) DEVICE — SUT VICRYL 0 27" UR-6

## (undated) DEVICE — TUBING SUCTION 20FT

## (undated) DEVICE — SUT SILK 0 24" SH DA

## (undated) DEVICE — PACK ROBOTIC LIJ

## (undated) DEVICE — TUBING STRYKEFLOW II SUCTION / IRRIGATOR

## (undated) DEVICE — XI VESSEL SEALER

## (undated) DEVICE — TROCAR SURGIQUEST AIRSEAL 12MMX100MM

## (undated) DEVICE — XI ARM GRASPER TIP UP FENESTRATED

## (undated) DEVICE — SUT VLOC 180 0 12" GS-21 GREEN

## (undated) DEVICE — TUBING HYDRO-SURG PLUS IRRIGATOR W SMOKEVAC & PROBE

## (undated) DEVICE — XI ARM PERMANENT CAUTERY SPATULA

## (undated) DEVICE — CATH IV SAFE BC 20G X 1.16" (PINK)

## (undated) DEVICE — SYR ALLIANCE II INFLATION 60ML

## (undated) DEVICE — SUT MONOCRYL 4-0 27" PS-2 UNDYED

## (undated) DEVICE — TUBING SUCTION CONN 6FT STERILE

## (undated) DEVICE — XI ARM CLIP APPLIER LARGE

## (undated) DEVICE — SENSOR O2 FINGER ADULT 24/CA

## (undated) DEVICE — TROCAR COVIDIEN VERSAPORT BLADELESS OPTICAL 5MM STANDARD

## (undated) DEVICE — FOLEY HOLDER STATLOCK 2 WAY ADULT

## (undated) DEVICE — XI ARM FORCEP PROGRASP 8MM

## (undated) DEVICE — XI ARM SCISSOR MONO CURVED

## (undated) DEVICE — VENODYNE/SCD SLEEVE CALF MEDIUM

## (undated) DEVICE — XI ARM CLIP APPLIER MEDIUM-LARGE

## (undated) DEVICE — XI NEEDLE DRIVER LARGE

## (undated) DEVICE — XI ARM DISSECTOR CURVED BIPOLAR 8MM

## (undated) DEVICE — XI ARM NEEDLE DRIVER SUTURECUT MEGA 8MM

## (undated) DEVICE — XI ARM FORCEP MARYLAND BIPOLAR

## (undated) DEVICE — DRSG GAUZE PACKTNER ROLL

## (undated) DEVICE — TUBING AIRSEAL TRI-LUMEN FILTERED

## (undated) DEVICE — LUBRICATING JELLY ONESHOT 1.25OZ

## (undated) DEVICE — XI ARM FORCEP FENESTRATED BIPOLAR 8MM

## (undated) DEVICE — CHEST DRAIN OASIS DRY SUCTION WATER SEAL

## (undated) DEVICE — D HELP - CLEARVIEW CLEARIFY SYSTEM

## (undated) DEVICE — NDL HYPO REGULAR BEVEL 22G X 1.5" (TURQUOISE)

## (undated) DEVICE — BLADE SCALPEL SAFETYLOCK #10

## (undated) DEVICE — XI DRAPE ARM

## (undated) DEVICE — XI ARM PERMANENT CAUTERY HOOK

## (undated) DEVICE — XI DRAPE COLUMN

## (undated) DEVICE — SUCTION YANKAUER NO CONTROL VENT

## (undated) DEVICE — MARKING PEN W RULER

## (undated) DEVICE — BITE BLOCK ADULT 20 X 27MM (GREEN)

## (undated) DEVICE — PACK IV START WITH CHG

## (undated) DEVICE — XI OBTURATOR OPTICAL BLADELESS 8MM

## (undated) DEVICE — BALLOON US ENDO

## (undated) DEVICE — XI SEAL UNIV 5- 8 MM

## (undated) DEVICE — TUBING IV SET GRAVITY 3Y 100" MACRO

## (undated) DEVICE — SYR LUER LOK 5CC

## (undated) DEVICE — SOL INJ NS 0.9% 500ML 2 PORT

## (undated) DEVICE — BIOPSY FORCEP RADIAL JAW 4 STANDARD WITH NEEDLE

## (undated) DEVICE — ELCTR BOVIE TIP BLADE INSULATED 2.75" EDGE

## (undated) DEVICE — SUT SILK 2-0 30" SH

## (undated) DEVICE — TROCAR SURGIQUEST AIRSEAL 8MMX120MM

## (undated) DEVICE — WARMING BLANKET LOWER ADULT

## (undated) DEVICE — GRASPER LAPA 5MMX35CM

## (undated) DEVICE — SYR LUER LOK 20CC

## (undated) DEVICE — CATH IV SAFE BC 22G X 1" (BLUE)

## (undated) DEVICE — XI ARM FORCEP TENACULUM

## (undated) DEVICE — POSITIONER STRAP ARMBOARD VELCRO TS-30

## (undated) DEVICE — SYR LUER LOK 50CC

## (undated) DEVICE — DRAIN PENROSE .25" X 18" LATEX